# Patient Record
Sex: MALE | Race: WHITE | Employment: OTHER | URBAN - METROPOLITAN AREA
[De-identification: names, ages, dates, MRNs, and addresses within clinical notes are randomized per-mention and may not be internally consistent; named-entity substitution may affect disease eponyms.]

---

## 2017-01-01 ENCOUNTER — APPOINTMENT (OUTPATIENT)
Dept: GENERAL RADIOLOGY | Facility: HOSPITAL | Age: 77
DRG: 871 | End: 2017-01-01
Attending: INTERNAL MEDICINE
Payer: MEDICARE

## 2017-01-01 ENCOUNTER — APPOINTMENT (OUTPATIENT)
Dept: ULTRASOUND IMAGING | Facility: HOSPITAL | Age: 77
DRG: 871 | End: 2017-01-01
Attending: HOSPITALIST
Payer: MEDICARE

## 2017-01-01 ENCOUNTER — APPOINTMENT (OUTPATIENT)
Dept: CT IMAGING | Facility: HOSPITAL | Age: 77
DRG: 871 | End: 2017-01-01
Attending: HOSPITALIST
Payer: MEDICARE

## 2017-01-01 ENCOUNTER — APPOINTMENT (OUTPATIENT)
Dept: GENERAL RADIOLOGY | Facility: HOSPITAL | Age: 77
DRG: 871 | End: 2017-01-01
Attending: EMERGENCY MEDICINE
Payer: MEDICARE

## 2017-01-01 ENCOUNTER — HOSPITAL ENCOUNTER (INPATIENT)
Facility: HOSPITAL | Age: 77
LOS: 14 days | DRG: 871 | End: 2017-01-01
Attending: EMERGENCY MEDICINE | Admitting: HOSPITALIST
Payer: MEDICARE

## 2017-01-01 ENCOUNTER — APPOINTMENT (OUTPATIENT)
Dept: ULTRASOUND IMAGING | Facility: HOSPITAL | Age: 77
DRG: 871 | End: 2017-01-01
Attending: Other
Payer: MEDICARE

## 2017-01-01 ENCOUNTER — APPOINTMENT (OUTPATIENT)
Dept: GENERAL RADIOLOGY | Facility: HOSPITAL | Age: 77
DRG: 871 | End: 2017-01-01
Attending: HOSPITALIST
Payer: MEDICARE

## 2017-01-01 ENCOUNTER — APPOINTMENT (OUTPATIENT)
Dept: CT IMAGING | Facility: HOSPITAL | Age: 77
DRG: 871 | End: 2017-01-01
Attending: INTERNAL MEDICINE
Payer: MEDICARE

## 2017-01-01 ENCOUNTER — APPOINTMENT (OUTPATIENT)
Dept: ULTRASOUND IMAGING | Facility: HOSPITAL | Age: 77
DRG: 871 | End: 2017-01-01
Attending: EMERGENCY MEDICINE
Payer: MEDICARE

## 2017-01-01 ENCOUNTER — APPOINTMENT (OUTPATIENT)
Dept: CV DIAGNOSTICS | Facility: HOSPITAL | Age: 77
DRG: 871 | End: 2017-01-01
Attending: HOSPITALIST
Payer: MEDICARE

## 2017-01-01 ENCOUNTER — ANESTHESIA (OUTPATIENT)
Dept: CARDIOLOGY UNIT | Facility: HOSPITAL | Age: 77
DRG: 871 | End: 2017-01-01
Payer: MEDICARE

## 2017-01-01 ENCOUNTER — ANESTHESIA EVENT (OUTPATIENT)
Dept: CARDIOLOGY UNIT | Facility: HOSPITAL | Age: 77
DRG: 871 | End: 2017-01-01
Payer: MEDICARE

## 2017-01-01 VITALS
DIASTOLIC BLOOD PRESSURE: 47 MMHG | HEIGHT: 70.98 IN | TEMPERATURE: 97 F | WEIGHT: 152.88 LBS | OXYGEN SATURATION: 50 % | SYSTOLIC BLOOD PRESSURE: 76 MMHG | BODY MASS INDEX: 21.4 KG/M2

## 2017-01-01 DIAGNOSIS — J18.9 COMMUNITY ACQUIRED PNEUMONIA: Primary | ICD-10-CM

## 2017-01-01 DIAGNOSIS — K75.9 HEPATITIS: ICD-10-CM

## 2017-01-01 DIAGNOSIS — E16.2 HYPOGLYCEMIA: ICD-10-CM

## 2017-01-01 LAB
AFP-TM SERPL-MCNC: 2.4 NG/ML (ref 0–8.9)
ALBUMIN SERPL BCP-MCNC: 1.5 G/DL (ref 3.5–4.8)
ALBUMIN SERPL BCP-MCNC: 2.1 G/DL (ref 3.5–4.8)
ALBUMIN SERPL BCP-MCNC: 2.2 G/DL (ref 3.5–4.8)
ALBUMIN SERPL BCP-MCNC: 2.2 G/DL (ref 3.5–4.8)
ALBUMIN SERPL BCP-MCNC: 2.3 G/DL (ref 3.5–4.8)
ALBUMIN SERPL BCP-MCNC: 2.4 G/DL (ref 3.5–4.8)
ALBUMIN SERPL BCP-MCNC: 2.6 G/DL (ref 3.5–4.8)
ALBUMIN SERPL BCP-MCNC: 2.6 G/DL (ref 3.5–4.8)
ALBUMIN SERPL BCP-MCNC: 2.7 G/DL (ref 3.5–4.8)
ALBUMIN SERPL BCP-MCNC: 2.7 G/DL (ref 3.5–4.8)
ALBUMIN SERPL BCP-MCNC: 2.8 G/DL (ref 3.5–4.8)
ALBUMIN/GLOB SERPL: 0.6 {RATIO} (ref 1–2)
ALBUMIN/GLOB SERPL: 0.7 {RATIO} (ref 1–2)
ALBUMIN/GLOB SERPL: 0.8 {RATIO} (ref 1–2)
ALBUMIN/GLOB SERPL: 0.8 {RATIO} (ref 1–2)
ALP SERPL-CCNC: 46 U/L (ref 32–100)
ALP SERPL-CCNC: 49 U/L (ref 32–100)
ALP SERPL-CCNC: 49 U/L (ref 32–100)
ALP SERPL-CCNC: 50 U/L (ref 32–100)
ALP SERPL-CCNC: 55 U/L (ref 32–100)
ALP SERPL-CCNC: 57 U/L (ref 32–100)
ALP SERPL-CCNC: 57 U/L (ref 32–100)
ALP SERPL-CCNC: 66 U/L (ref 32–100)
ALP SERPL-CCNC: 69 U/L (ref 32–100)
ALP SERPL-CCNC: 70 U/L (ref 32–100)
ALP SERPL-CCNC: 82 U/L (ref 32–100)
ALP SERPL-CCNC: 84 U/L (ref 32–100)
ALP SERPL-CCNC: 85 U/L (ref 32–100)
ALT SERPL-CCNC: 104 U/L (ref 17–63)
ALT SERPL-CCNC: 132 U/L (ref 17–63)
ALT SERPL-CCNC: 146 U/L (ref 17–63)
ALT SERPL-CCNC: 152 U/L (ref 17–63)
ALT SERPL-CCNC: 236 U/L (ref 17–63)
ALT SERPL-CCNC: 268 U/L (ref 17–63)
ALT SERPL-CCNC: 347 U/L (ref 17–63)
ALT SERPL-CCNC: 426 U/L (ref 17–63)
ALT SERPL-CCNC: 437 U/L (ref 17–63)
ALT SERPL-CCNC: 54 U/L (ref 17–63)
ALT SERPL-CCNC: 54 U/L (ref 17–63)
ALT SERPL-CCNC: 76 U/L (ref 17–63)
ALT SERPL-CCNC: 90 U/L (ref 17–63)
AMMONIA PLAS-MCNC: 26 UG/DL (ref 28.2–80.4)
AMMONIA PLAS-MCNC: 31 UG/DL (ref 28.2–80.4)
AMMONIA PLAS-MCNC: 60 UG/DL (ref 28.2–80.4)
ANA NUCLEOLAR TITR SER IF: 80 {TITER}
ANION GAP SERPL CALC-SCNC: 10 MMOL/L (ref 0–18)
ANION GAP SERPL CALC-SCNC: 11 MMOL/L (ref 0–18)
ANION GAP SERPL CALC-SCNC: 12 MMOL/L (ref 0–18)
ANION GAP SERPL CALC-SCNC: 13 MMOL/L (ref 0–18)
ANION GAP SERPL CALC-SCNC: 17 MMOL/L (ref 0–18)
ANION GAP SERPL CALC-SCNC: 17 MMOL/L (ref 0–18)
ANION GAP SERPL CALC-SCNC: 18 MMOL/L (ref 0–18)
ANION GAP SERPL CALC-SCNC: 4 MMOL/L (ref 0–18)
ANION GAP SERPL CALC-SCNC: 8 MMOL/L (ref 0–18)
ANION GAP SERPL CALC-SCNC: 9 MMOL/L (ref 0–18)
ANION GAP SERPL CALC-SCNC: 9 MMOL/L (ref 0–18)
ANTIBODY SCREEN: NEGATIVE
ANTIBODY SCREEN: NEGATIVE
APAP SERPL-MCNC: <10 MCG/ML (ref 10–30)
APTT PPP: 48.2 SECONDS (ref 23.2–35.3)
APTT PPP: 50.1 SECONDS (ref 23.2–35.3)
APTT PPP: 59.8 SECONDS (ref 23.2–35.3)
AST SERPL-CCNC: 1161 U/L (ref 15–41)
AST SERPL-CCNC: 122 U/L (ref 15–41)
AST SERPL-CCNC: 161 U/L (ref 15–41)
AST SERPL-CCNC: 288 U/L (ref 15–41)
AST SERPL-CCNC: 297 U/L (ref 15–41)
AST SERPL-CCNC: 33 U/L (ref 15–41)
AST SERPL-CCNC: 410 U/L (ref 15–41)
AST SERPL-CCNC: 43 U/L (ref 15–41)
AST SERPL-CCNC: 55 U/L (ref 15–41)
AST SERPL-CCNC: 59 U/L (ref 15–41)
AST SERPL-CCNC: 662 U/L (ref 15–41)
AST SERPL-CCNC: 74 U/L (ref 15–41)
AST SERPL-CCNC: 998 U/L (ref 15–41)
BASE EXCESS BLD CALC-SCNC: -12.2 MMOL/L (ref ?–2)
BASE EXCESS BLD CALC-SCNC: -12.2 MMOL/L (ref ?–2)
BASE EXCESS BLD CALC-SCNC: -15.9 MMOL/L (ref ?–2)
BASE EXCESS BLD CALC-SCNC: -19 MMOL/L (ref ?–2)
BASE EXCESS BLD CALC-SCNC: -8 MMOL/L (ref ?–2)
BASOPHILS # BLD: 0 K/UL (ref 0–0.2)
BASOPHILS # BLD: 0.1 K/UL (ref 0–0.2)
BASOPHILS NFR BLD: 0 %
BASOPHILS NFR BLD: 1 %
BILIRUB DIRECT SERPL-MCNC: 2.3 MG/DL (ref 0–0.2)
BILIRUB SERPL-MCNC: 3.1 MG/DL (ref 0.3–1.2)
BILIRUB SERPL-MCNC: 3.2 MG/DL (ref 0.3–1.2)
BILIRUB SERPL-MCNC: 3.5 MG/DL (ref 0.3–1.2)
BILIRUB SERPL-MCNC: 3.7 MG/DL (ref 0.3–1.2)
BILIRUB SERPL-MCNC: 3.8 MG/DL (ref 0.3–1.2)
BILIRUB SERPL-MCNC: 4 MG/DL (ref 0.3–1.2)
BILIRUB SERPL-MCNC: 4 MG/DL (ref 0.3–1.2)
BILIRUB SERPL-MCNC: 4.4 MG/DL (ref 0.3–1.2)
BILIRUB SERPL-MCNC: 5.4 MG/DL (ref 0.3–1.2)
BILIRUB SERPL-MCNC: 5.6 MG/DL (ref 0.3–1.2)
BILIRUB SERPL-MCNC: 5.7 MG/DL (ref 0.3–1.2)
BILIRUB SERPL-MCNC: 5.9 MG/DL (ref 0.3–1.2)
BILIRUB SERPL-MCNC: 6.9 MG/DL (ref 0.3–1.2)
BILIRUB UR QL: NEGATIVE
BILIRUB UR QL: NEGATIVE
BILIRUB UR QL: POSITIVE
BLOOD TYPE BARCODE: 6200
BODY TEMPERATURE: 36
BUN SERPL-MCNC: 16 MG/DL (ref 8–20)
BUN SERPL-MCNC: 17 MG/DL (ref 8–20)
BUN SERPL-MCNC: 18 MG/DL (ref 8–20)
BUN SERPL-MCNC: 18 MG/DL (ref 8–20)
BUN SERPL-MCNC: 20 MG/DL (ref 8–20)
BUN SERPL-MCNC: 20 MG/DL (ref 8–20)
BUN SERPL-MCNC: 26 MG/DL (ref 8–20)
BUN SERPL-MCNC: 33 MG/DL (ref 8–20)
BUN SERPL-MCNC: 37 MG/DL (ref 8–20)
BUN SERPL-MCNC: 38 MG/DL (ref 8–20)
BUN SERPL-MCNC: 39 MG/DL (ref 8–20)
BUN SERPL-MCNC: 40 MG/DL (ref 8–20)
BUN SERPL-MCNC: 43 MG/DL (ref 8–20)
BUN SERPL-MCNC: 43 MG/DL (ref 8–20)
BUN/CREAT SERPL: 10.1 (ref 10–20)
BUN/CREAT SERPL: 12.2 (ref 10–20)
BUN/CREAT SERPL: 12.8 (ref 10–20)
BUN/CREAT SERPL: 16.8 (ref 10–20)
BUN/CREAT SERPL: 17.7 (ref 10–20)
BUN/CREAT SERPL: 18.4 (ref 10–20)
BUN/CREAT SERPL: 21.6 (ref 10–20)
BUN/CREAT SERPL: 23.5 (ref 10–20)
BUN/CREAT SERPL: 23.9 (ref 10–20)
BUN/CREAT SERPL: 24.5 (ref 10–20)
BUN/CREAT SERPL: 27 (ref 10–20)
BUN/CREAT SERPL: 27.1 (ref 10–20)
BUN/CREAT SERPL: 8.7 (ref 10–20)
BUN/CREAT SERPL: 8.9 (ref 10–20)
BUN/CREAT SERPL: 9.7 (ref 10–20)
BUN/CREAT SERPL: 9.9 (ref 10–20)
C282Y HEMOCHROMATOSIS MUTATION: NEGATIVE
CA-I BLD-SCNC: 1.23 MMOL/L (ref 0.95–1.32)
CALCIUM SERPL-MCNC: 7.3 MG/DL (ref 8.5–10.5)
CALCIUM SERPL-MCNC: 8.3 MG/DL (ref 8.5–10.5)
CALCIUM SERPL-MCNC: 8.3 MG/DL (ref 8.5–10.5)
CALCIUM SERPL-MCNC: 8.4 MG/DL (ref 8.5–10.5)
CALCIUM SERPL-MCNC: 8.5 MG/DL (ref 8.5–10.5)
CALCIUM SERPL-MCNC: 8.6 MG/DL (ref 8.5–10.5)
CALCIUM SERPL-MCNC: 8.8 MG/DL (ref 8.5–10.5)
CALCIUM SERPL-MCNC: 9.1 MG/DL (ref 8.5–10.5)
CHLORIDE SERPL-SCNC: 100 MMOL/L (ref 95–110)
CHLORIDE SERPL-SCNC: 103 MMOL/L (ref 95–110)
CHLORIDE SERPL-SCNC: 103 MMOL/L (ref 95–110)
CHLORIDE SERPL-SCNC: 105 MMOL/L (ref 95–110)
CHLORIDE SERPL-SCNC: 105 MMOL/L (ref 95–110)
CHLORIDE SERPL-SCNC: 107 MMOL/L (ref 95–110)
CHLORIDE SERPL-SCNC: 108 MMOL/L (ref 95–110)
CHLORIDE SERPL-SCNC: 108 MMOL/L (ref 95–110)
CHLORIDE SERPL-SCNC: 109 MMOL/L (ref 95–110)
CHLORIDE SERPL-SCNC: 110 MMOL/L (ref 95–110)
CHLORIDE SERPL-SCNC: 111 MMOL/L (ref 95–110)
CHLORIDE SERPL-SCNC: 111 MMOL/L (ref 95–110)
CHLORIDE SERPL-SCNC: 95 MMOL/L (ref 95–110)
CHLORIDE SERPL-SCNC: 97 MMOL/L (ref 95–110)
CHOLEST SERPL-MCNC: 132 MG/DL (ref 110–200)
CO2 SERPL-SCNC: 13 MMOL/L (ref 22–32)
CO2 SERPL-SCNC: 14 MMOL/L (ref 22–32)
CO2 SERPL-SCNC: 15 MMOL/L (ref 22–32)
CO2 SERPL-SCNC: 17 MMOL/L (ref 22–32)
CO2 SERPL-SCNC: 17 MMOL/L (ref 22–32)
CO2 SERPL-SCNC: 18 MMOL/L (ref 22–32)
CO2 SERPL-SCNC: 19 MMOL/L (ref 22–32)
CO2 SERPL-SCNC: 20 MMOL/L (ref 22–32)
CO2 SERPL-SCNC: 25 MMOL/L (ref 22–32)
COHGB MFR BLD: 1.6 % (ref 0–1.5)
COLOR UR: YELLOW
COLOR UR: YELLOW
CREAT SERPL-MCNC: 1.41 MG/DL (ref 0.5–1.5)
CREAT SERPL-MCNC: 1.44 MG/DL (ref 0.5–1.5)
CREAT SERPL-MCNC: 1.55 MG/DL (ref 0.5–1.5)
CREAT SERPL-MCNC: 1.64 MG/DL (ref 0.5–1.5)
CREAT SERPL-MCNC: 1.78 MG/DL (ref 0.5–1.5)
CREAT SERPL-MCNC: 1.8 MG/DL (ref 0.5–1.5)
CREAT SERPL-MCNC: 1.83 MG/DL (ref 0.5–1.5)
CREAT SERPL-MCNC: 1.84 MG/DL (ref 0.5–1.5)
CREAT SERPL-MCNC: 1.85 MG/DL (ref 0.5–1.5)
CREAT SERPL-MCNC: 1.85 MG/DL (ref 0.5–1.5)
CREAT SERPL-MCNC: 1.92 MG/DL (ref 0.5–1.5)
CREAT SERPL-MCNC: 1.97 MG/DL (ref 0.5–1.5)
CREAT SERPL-MCNC: 2.03 MG/DL (ref 0.5–1.5)
CREAT SERPL-MCNC: 2.03 MG/DL (ref 0.5–1.5)
CREAT SERPL-MCNC: 2.06 MG/DL (ref 0.5–1.5)
CREAT SERPL-MCNC: 2.09 MG/DL (ref 0.5–1.5)
EOSINOPHIL # BLD: 0 K/UL (ref 0–0.7)
EOSINOPHIL # BLD: 0.1 K/UL (ref 0–0.7)
EOSINOPHIL NFR BLD: 0 %
EOSINOPHIL NFR BLD: 1 %
EOSINOPHIL NFR BLD: 2 %
EOSINOPHIL NFR BLD: 2 %
EOSINOPHIL NFR BLD: 3 %
ERYTHROCYTE [DISTWIDTH] IN BLOOD BY AUTOMATED COUNT: 19.3 % (ref 11–15)
ERYTHROCYTE [DISTWIDTH] IN BLOOD BY AUTOMATED COUNT: 19.3 % (ref 11–15)
ERYTHROCYTE [DISTWIDTH] IN BLOOD BY AUTOMATED COUNT: 19.4 % (ref 11–15)
ERYTHROCYTE [DISTWIDTH] IN BLOOD BY AUTOMATED COUNT: 19.4 % (ref 11–15)
ERYTHROCYTE [DISTWIDTH] IN BLOOD BY AUTOMATED COUNT: 19.5 % (ref 11–15)
ERYTHROCYTE [DISTWIDTH] IN BLOOD BY AUTOMATED COUNT: 19.5 % (ref 11–15)
ERYTHROCYTE [DISTWIDTH] IN BLOOD BY AUTOMATED COUNT: 19.6 % (ref 11–15)
ERYTHROCYTE [DISTWIDTH] IN BLOOD BY AUTOMATED COUNT: 19.9 % (ref 11–15)
ERYTHROCYTE [DISTWIDTH] IN BLOOD BY AUTOMATED COUNT: 20 % (ref 11–15)
ERYTHROCYTE [DISTWIDTH] IN BLOOD BY AUTOMATED COUNT: 20.8 % (ref 11–15)
ERYTHROCYTE [DISTWIDTH] IN BLOOD BY AUTOMATED COUNT: 20.8 % (ref 11–15)
ERYTHROCYTE [DISTWIDTH] IN BLOOD BY AUTOMATED COUNT: 21.1 % (ref 11–15)
ERYTHROCYTE [DISTWIDTH] IN BLOOD BY AUTOMATED COUNT: 21.3 % (ref 11–15)
ERYTHROCYTE [DISTWIDTH] IN BLOOD BY AUTOMATED COUNT: 21.9 % (ref 11–15)
ERYTHROCYTE [DISTWIDTH] IN BLOOD BY AUTOMATED COUNT: 21.9 % (ref 11–15)
ERYTHROCYTE [DISTWIDTH] IN BLOOD BY AUTOMATED COUNT: 23.8 % (ref 11–15)
ERYTHROCYTE [DISTWIDTH] IN BLOOD BY AUTOMATED COUNT: 24 % (ref 11–15)
FERRITIN SERPL IA-MCNC: 1897 NG/ML (ref 24–336)
GLOBULIN PLAS-MCNC: 2.3 G/DL (ref 2.5–3.7)
GLOBULIN PLAS-MCNC: 3.3 G/DL (ref 2.5–3.7)
GLOBULIN PLAS-MCNC: 3.4 G/DL (ref 2.5–3.7)
GLOBULIN PLAS-MCNC: 3.5 G/DL (ref 2.5–3.7)
GLOBULIN PLAS-MCNC: 3.5 G/DL (ref 2.5–3.7)
GLOBULIN PLAS-MCNC: 3.6 G/DL (ref 2.5–3.7)
GLOBULIN PLAS-MCNC: 3.6 G/DL (ref 2.5–3.7)
GLOBULIN PLAS-MCNC: 3.7 G/DL (ref 2.5–3.7)
GLOBULIN PLAS-MCNC: 3.7 G/DL (ref 2.5–3.7)
GLOBULIN PLAS-MCNC: 3.9 G/DL (ref 2.5–3.7)
GLUCOSE BLDC GLUCOMTR-MCNC: 104 MG/DL (ref 70–99)
GLUCOSE BLDC GLUCOMTR-MCNC: 114 MG/DL (ref 70–99)
GLUCOSE BLDC GLUCOMTR-MCNC: 116 MG/DL (ref 70–99)
GLUCOSE BLDC GLUCOMTR-MCNC: 118 MG/DL (ref 70–99)
GLUCOSE BLDC GLUCOMTR-MCNC: 135 MG/DL (ref 70–99)
GLUCOSE BLDC GLUCOMTR-MCNC: 136 MG/DL (ref 70–99)
GLUCOSE BLDC GLUCOMTR-MCNC: 138 MG/DL (ref 70–99)
GLUCOSE BLDC GLUCOMTR-MCNC: 139 MG/DL (ref 70–99)
GLUCOSE BLDC GLUCOMTR-MCNC: 144 MG/DL (ref 70–99)
GLUCOSE BLDC GLUCOMTR-MCNC: 164 MG/DL (ref 70–99)
GLUCOSE BLDC GLUCOMTR-MCNC: 166 MG/DL (ref 70–99)
GLUCOSE BLDC GLUCOMTR-MCNC: 167 MG/DL (ref 70–99)
GLUCOSE BLDC GLUCOMTR-MCNC: 170 MG/DL (ref 70–99)
GLUCOSE BLDC GLUCOMTR-MCNC: 173 MG/DL (ref 70–99)
GLUCOSE BLDC GLUCOMTR-MCNC: 189 MG/DL (ref 70–99)
GLUCOSE BLDC GLUCOMTR-MCNC: 196 MG/DL (ref 70–99)
GLUCOSE BLDC GLUCOMTR-MCNC: 196 MG/DL (ref 70–99)
GLUCOSE BLDC GLUCOMTR-MCNC: 203 MG/DL (ref 70–99)
GLUCOSE BLDC GLUCOMTR-MCNC: 208 MG/DL (ref 70–99)
GLUCOSE BLDC GLUCOMTR-MCNC: 216 MG/DL (ref 70–99)
GLUCOSE BLDC GLUCOMTR-MCNC: 221 MG/DL (ref 70–99)
GLUCOSE BLDC GLUCOMTR-MCNC: 227 MG/DL (ref 70–99)
GLUCOSE BLDC GLUCOMTR-MCNC: 248 MG/DL (ref 70–99)
GLUCOSE BLDC GLUCOMTR-MCNC: 249 MG/DL (ref 70–99)
GLUCOSE BLDC GLUCOMTR-MCNC: 256 MG/DL (ref 70–99)
GLUCOSE BLDC GLUCOMTR-MCNC: 272 MG/DL (ref 70–99)
GLUCOSE BLDC GLUCOMTR-MCNC: 37 MG/DL (ref 70–99)
GLUCOSE BLDC GLUCOMTR-MCNC: 38 MG/DL (ref 70–99)
GLUCOSE BLDC GLUCOMTR-MCNC: 49 MG/DL (ref 70–99)
GLUCOSE BLDC GLUCOMTR-MCNC: 71 MG/DL (ref 70–99)
GLUCOSE BLDC GLUCOMTR-MCNC: 77 MG/DL (ref 70–99)
GLUCOSE BLDC GLUCOMTR-MCNC: 80 MG/DL (ref 70–99)
GLUCOSE BLDC GLUCOMTR-MCNC: 84 MG/DL (ref 70–99)
GLUCOSE BLDC GLUCOMTR-MCNC: 89 MG/DL (ref 70–99)
GLUCOSE BLDC GLUCOMTR-MCNC: 90 MG/DL (ref 70–99)
GLUCOSE BLDC GLUCOMTR-MCNC: 91 MG/DL (ref 70–99)
GLUCOSE BLDC GLUCOMTR-MCNC: 92 MG/DL (ref 70–99)
GLUCOSE BLDC GLUCOMTR-MCNC: 95 MG/DL (ref 70–99)
GLUCOSE BLDC GLUCOMTR-MCNC: 97 MG/DL (ref 70–99)
GLUCOSE BLDC GLUCOMTR-MCNC: <30 MG/DL (ref 70–99)
GLUCOSE BLDC GLUCOMTR-MCNC: <30 MG/DL (ref 70–99)
GLUCOSE SERPL-MCNC: 105 MG/DL (ref 70–99)
GLUCOSE SERPL-MCNC: 145 MG/DL (ref 70–99)
GLUCOSE SERPL-MCNC: 152 MG/DL (ref 70–99)
GLUCOSE SERPL-MCNC: 160 MG/DL (ref 70–99)
GLUCOSE SERPL-MCNC: 162 MG/DL (ref 70–99)
GLUCOSE SERPL-MCNC: 164 MG/DL (ref 70–99)
GLUCOSE SERPL-MCNC: 167 MG/DL (ref 70–99)
GLUCOSE SERPL-MCNC: 168 MG/DL (ref 70–99)
GLUCOSE SERPL-MCNC: 179 MG/DL (ref 70–99)
GLUCOSE SERPL-MCNC: 183 MG/DL (ref 70–99)
GLUCOSE SERPL-MCNC: 187 MG/DL (ref 70–99)
GLUCOSE SERPL-MCNC: 187 MG/DL (ref 70–99)
GLUCOSE SERPL-MCNC: 199 MG/DL (ref 70–99)
GLUCOSE SERPL-MCNC: 202 MG/DL (ref 70–99)
GLUCOSE SERPL-MCNC: 220 MG/DL (ref 70–99)
GLUCOSE SERPL-MCNC: 246 MG/DL (ref 70–99)
GLUCOSE UR-MCNC: NEGATIVE MG/DL
GRAN CASTS #/AREA UR COMP ASSIST: 1 /LPF
H63D HEMOCHROMATOSIS MUTATION: NEGATIVE
HAV IGM SERPL QL IA: NONREACTIVE
HBA1C MFR BLD: 5.6 % (ref 4–6)
HBV CORE IGM SERPL QL IA: NONREACTIVE
HBV SURFACE AG SERPL QL IA: NONREACTIVE
HCO3 BLDA-SCNC: 10 MEQ/L (ref 21–27)
HCO3 BLDA-SCNC: 12.9 MEQ/L (ref 21–27)
HCO3 BLDA-SCNC: 13.4 MEQ/L (ref 21–27)
HCO3 BLDA-SCNC: 15.5 MEQ/L (ref 21–27)
HCO3 BLDV-SCNC: 13.6 MEQ/L (ref 22–26)
HCT VFR BLD AUTO: 34.5 % (ref 41–52)
HCT VFR BLD AUTO: 34.7 % (ref 41–52)
HCT VFR BLD AUTO: 35.8 % (ref 41–52)
HCT VFR BLD AUTO: 36.3 % (ref 41–52)
HCT VFR BLD AUTO: 36.4 % (ref 41–52)
HCT VFR BLD AUTO: 37.1 % (ref 41–52)
HCT VFR BLD AUTO: 37.4 % (ref 41–52)
HCT VFR BLD AUTO: 37.4 % (ref 41–52)
HCT VFR BLD AUTO: 37.5 % (ref 41–52)
HCT VFR BLD AUTO: 37.9 % (ref 41–52)
HCT VFR BLD AUTO: 38.4 % (ref 41–52)
HCT VFR BLD AUTO: 38.5 % (ref 41–52)
HCT VFR BLD AUTO: 39.1 % (ref 41–52)
HCT VFR BLD AUTO: 39.3 % (ref 41–52)
HCT VFR BLD AUTO: 39.7 % (ref 41–52)
HCT VFR BLD AUTO: 40.5 % (ref 41–52)
HCT VFR BLD AUTO: 40.7 % (ref 41–52)
HCV AB SERPL QL IA: NONREACTIVE
HDLC SERPL-MCNC: 24 MG/DL
HGB BLD-MCNC: 10.4 G/DL (ref 13.5–17.5)
HGB BLD-MCNC: 11.1 G/DL (ref 13.5–17.5)
HGB BLD-MCNC: 11.3 G/DL (ref 13.5–17.5)
HGB BLD-MCNC: 11.8 G/DL (ref 13.5–17.5)
HGB BLD-MCNC: 12.3 G/DL (ref 13.5–17.5)
HGB BLD-MCNC: 12.4 G/DL (ref 13.5–17.5)
HGB BLD-MCNC: 12.5 G/DL (ref 13.5–17.5)
HGB BLD-MCNC: 12.5 G/DL (ref 13.5–17.5)
HGB BLD-MCNC: 12.6 G/DL (ref 13.5–17.5)
HGB BLD-MCNC: 12.7 G/DL (ref 13.5–17.5)
HGB BLD-MCNC: 12.9 G/DL (ref 13.5–17.5)
HGB BLD-MCNC: 13.2 G/DL (ref 13.5–17.5)
HGB BLD-MCNC: 13.4 G/DL (ref 13.5–17.5)
HGB BLD-MCNC: 13.6 G/DL (ref 13.5–17.5)
HGB BLD-MCNC: 13.7 G/DL (ref 13.5–17.5)
HGB BLD-MCNC: 13.7 G/DL (ref 13.5–17.5)
HYALINE CASTS #/AREA URNS AUTO: 2 /LPF
HYALINE CASTS #/AREA URNS AUTO: 32 /LPF
INR BLD: 1.4 (ref 0.9–1.2)
INR BLD: 1.5 (ref 0.9–1.2)
INR BLD: 1.6 (ref 0.9–1.2)
INR BLD: 1.6 (ref 0.9–1.2)
INR BLD: 1.7 (ref 0.9–1.2)
INR BLD: 1.8 (ref 0.9–1.2)
INR BLD: 2.3 (ref 0.9–1.2)
INR BLD: 2.3 (ref 0.9–1.2)
INR BLD: 3 (ref 0.9–1.2)
INR BLD: 3.7 (ref 0.9–1.2)
IRON SATN MFR SERPL: 91 % (ref 20–55)
IRON SERPL-MCNC: 190 MCG/DL (ref 45–182)
KETONES UR-MCNC: NEGATIVE MG/DL
KETONES UR-MCNC: NEGATIVE MG/DL
LACTATE SERPL-SCNC: 10.8 MMOL/L (ref 0.5–2.2)
LACTATE SERPL-SCNC: 11 MMOL/L (ref 0.5–2.2)
LACTATE SERPL-SCNC: 12.4 MMOL/L (ref 0.5–2.2)
LACTATE SERPL-SCNC: 2.3 MMOL/L (ref 0.5–2.2)
LACTATE SERPL-SCNC: 2.5 MMOL/L (ref 0.5–2.2)
LACTATE SERPL-SCNC: 2.6 MMOL/L (ref 0.5–2.2)
LACTATE SERPL-SCNC: 2.7 MMOL/L (ref 0.5–2.2)
LACTATE SERPL-SCNC: 3 MMOL/L (ref 0.5–2.2)
LACTATE SERPL-SCNC: 3.5 MMOL/L (ref 0.5–2.2)
LACTATE SERPL-SCNC: 4.4 MMOL/L (ref 0.5–2.2)
LACTATE SERPL-SCNC: 5.2 MMOL/L (ref 0.5–2.2)
LDLC SERPL CALC-MCNC: 91 MG/DL (ref 0–99)
LEUKOCYTE ESTERASE UR QL STRIP.AUTO: NEGATIVE
LYMPHOCYTES # BLD: 0.2 K/UL (ref 1–4)
LYMPHOCYTES # BLD: 0.3 K/UL (ref 1–4)
LYMPHOCYTES # BLD: 0.4 K/UL (ref 1–4)
LYMPHOCYTES # BLD: 0.4 K/UL (ref 1–4)
LYMPHOCYTES # BLD: 0.5 K/UL (ref 1–4)
LYMPHOCYTES # BLD: 0.5 K/UL (ref 1–4)
LYMPHOCYTES # BLD: 0.6 K/UL (ref 1–4)
LYMPHOCYTES # BLD: 0.7 K/UL (ref 1–4)
LYMPHOCYTES # BLD: 0.8 K/UL (ref 1–4)
LYMPHOCYTES NFR BLD: 13 %
LYMPHOCYTES NFR BLD: 17 %
LYMPHOCYTES NFR BLD: 2 %
LYMPHOCYTES NFR BLD: 3 %
LYMPHOCYTES NFR BLD: 4 %
LYMPHOCYTES NFR BLD: 5 %
LYMPHOCYTES NFR BLD: 6 %
MAGNESIUM SERPL-MCNC: 1.4 MG/DL (ref 1.8–2.5)
MAGNESIUM SERPL-MCNC: 1.7 MG/DL (ref 1.8–2.5)
MAGNESIUM SERPL-MCNC: 1.8 MG/DL (ref 1.8–2.5)
MAGNESIUM SERPL-MCNC: 1.9 MG/DL (ref 1.8–2.5)
MAGNESIUM SERPL-MCNC: 1.9 MG/DL (ref 1.8–2.5)
MAGNESIUM SERPL-MCNC: 2 MG/DL (ref 1.8–2.5)
MAGNESIUM SERPL-MCNC: 2.2 MG/DL (ref 1.8–2.5)
MCH RBC QN AUTO: 33.1 PG (ref 27–32)
MCH RBC QN AUTO: 33.3 PG (ref 27–32)
MCH RBC QN AUTO: 33.4 PG (ref 27–32)
MCH RBC QN AUTO: 33.4 PG (ref 27–32)
MCH RBC QN AUTO: 33.5 PG (ref 27–32)
MCH RBC QN AUTO: 33.5 PG (ref 27–32)
MCH RBC QN AUTO: 33.6 PG (ref 27–32)
MCH RBC QN AUTO: 33.6 PG (ref 27–32)
MCH RBC QN AUTO: 33.7 PG (ref 27–32)
MCH RBC QN AUTO: 33.9 PG (ref 27–32)
MCH RBC QN AUTO: 33.9 PG (ref 27–32)
MCH RBC QN AUTO: 34.7 PG (ref 27–32)
MCHC RBC AUTO-ENTMCNC: 30.7 G/DL (ref 32–37)
MCHC RBC AUTO-ENTMCNC: 32.3 G/DL (ref 32–37)
MCHC RBC AUTO-ENTMCNC: 33 G/DL (ref 32–37)
MCHC RBC AUTO-ENTMCNC: 33.1 G/DL (ref 32–37)
MCHC RBC AUTO-ENTMCNC: 33.2 G/DL (ref 32–37)
MCHC RBC AUTO-ENTMCNC: 33.5 G/DL (ref 32–37)
MCHC RBC AUTO-ENTMCNC: 33.5 G/DL (ref 32–37)
MCHC RBC AUTO-ENTMCNC: 33.7 G/DL (ref 32–37)
MCHC RBC AUTO-ENTMCNC: 33.8 G/DL (ref 32–37)
MCHC RBC AUTO-ENTMCNC: 33.8 G/DL (ref 32–37)
MCHC RBC AUTO-ENTMCNC: 33.9 G/DL (ref 32–37)
MCHC RBC AUTO-ENTMCNC: 34 G/DL (ref 32–37)
MCHC RBC AUTO-ENTMCNC: 34.1 G/DL (ref 32–37)
MCHC RBC AUTO-ENTMCNC: 34.1 G/DL (ref 32–37)
MCHC RBC AUTO-ENTMCNC: 34.2 G/DL (ref 32–37)
MCHC RBC AUTO-ENTMCNC: 34.4 G/DL (ref 32–37)
MCHC RBC AUTO-ENTMCNC: 34.5 G/DL (ref 32–37)
MCV RBC AUTO: 100.7 FL (ref 80–100)
MCV RBC AUTO: 101.2 FL (ref 80–100)
MCV RBC AUTO: 102.4 FL (ref 80–100)
MCV RBC AUTO: 107.5 FL (ref 80–100)
MCV RBC AUTO: 110.7 FL (ref 80–100)
MCV RBC AUTO: 97.3 FL (ref 80–100)
MCV RBC AUTO: 97.6 FL (ref 80–100)
MCV RBC AUTO: 97.9 FL (ref 80–100)
MCV RBC AUTO: 98 FL (ref 80–100)
MCV RBC AUTO: 98.2 FL (ref 80–100)
MCV RBC AUTO: 98.3 FL (ref 80–100)
MCV RBC AUTO: 98.4 FL (ref 80–100)
MCV RBC AUTO: 98.4 FL (ref 80–100)
MCV RBC AUTO: 98.5 FL (ref 80–100)
MCV RBC AUTO: 98.5 FL (ref 80–100)
MCV RBC AUTO: 99.6 FL (ref 80–100)
MCV RBC AUTO: 99.8 FL (ref 80–100)
METAMYELOCYTES # BLD MANUAL: 0.18 K/UL
METAMYELOCYTES # BLD MANUAL: 0.26 K/UL
METAMYELOCYTES NFR BLD: 3 %
METHGB MFR BLD: 0.2 % SAT (ref 0.4–1.5)
MODIFIED ALLEN TEST: POSITIVE
MONOCYTES # BLD: 0.1 K/UL (ref 0–1)
MONOCYTES # BLD: 0.2 K/UL (ref 0–1)
MONOCYTES # BLD: 0.3 K/UL (ref 0–1)
MONOCYTES # BLD: 0.3 K/UL (ref 0–1)
MONOCYTES # BLD: 0.4 K/UL (ref 0–1)
MONOCYTES # BLD: 0.5 K/UL (ref 0–1)
MONOCYTES # BLD: 0.6 K/UL (ref 0–1)
MONOCYTES # BLD: 0.7 K/UL (ref 0–1)
MONOCYTES # BLD: 0.7 K/UL (ref 0–1)
MONOCYTES # BLD: 1.3 K/UL (ref 0–1)
MONOCYTES # BLD: 1.3 K/UL (ref 0–1)
MONOCYTES NFR BLD: 2 %
MONOCYTES NFR BLD: 2 %
MONOCYTES NFR BLD: 3 %
MONOCYTES NFR BLD: 4 %
MONOCYTES NFR BLD: 5 %
MONOCYTES NFR BLD: 6 %
MONOCYTES NFR BLD: 7 %
MONOCYTES NFR BLD: 8 %
MONOCYTES NFR BLD: 8 %
MONOCYTES NFR BLD: 9 %
MRSA DNA SPEC QL NAA+PROBE: NEGATIVE
MYELOCYTES NFR BLD: 2 %
NEUTROPHILS # BLD AUTO: 10.2 K/UL (ref 1.8–7.7)
NEUTROPHILS # BLD AUTO: 11.5 K/UL (ref 1.8–7.7)
NEUTROPHILS # BLD AUTO: 11.8 K/UL (ref 1.8–7.7)
NEUTROPHILS # BLD AUTO: 14.1 K/UL (ref 1.8–7.7)
NEUTROPHILS # BLD AUTO: 3.1 K/UL (ref 1.8–7.7)
NEUTROPHILS # BLD AUTO: 3.5 K/UL (ref 1.8–7.7)
NEUTROPHILS # BLD AUTO: 5.4 K/UL (ref 1.8–7.7)
NEUTROPHILS # BLD AUTO: 7.1 K/UL (ref 1.8–7.7)
NEUTROPHILS # BLD AUTO: 7.3 K/UL (ref 1.8–7.7)
NEUTROPHILS # BLD AUTO: 7.5 K/UL (ref 1.8–7.7)
NEUTROPHILS # BLD AUTO: 7.6 K/UL (ref 1.8–7.7)
NEUTROPHILS # BLD AUTO: 7.7 K/UL (ref 1.8–7.7)
NEUTROPHILS # BLD AUTO: 8.1 K/UL (ref 1.8–7.7)
NEUTROPHILS # BLD AUTO: 8.6 K/UL (ref 1.8–7.7)
NEUTROPHILS # BLD AUTO: 9.3 K/UL (ref 1.8–7.7)
NEUTROPHILS # BLD AUTO: 9.5 K/UL (ref 1.8–7.7)
NEUTROPHILS # BLD AUTO: 9.6 K/UL (ref 1.8–7.7)
NEUTROPHILS NFR BLD: 39 %
NEUTROPHILS NFR BLD: 62 %
NEUTROPHILS NFR BLD: 74 %
NEUTROPHILS NFR BLD: 80 %
NEUTROPHILS NFR BLD: 87 %
NEUTROPHILS NFR BLD: 89 %
NEUTROPHILS NFR BLD: 90 %
NEUTROPHILS NFR BLD: 91 %
NEUTROPHILS NFR BLD: 92 %
NEUTROPHILS NFR BLD: 93 %
NEUTROPHILS NFR BLD: 93 %
NEUTS BAND NFR BLD: 23 %
NEUTS BAND NFR BLD: 49 %
NITRITE UR QL STRIP.AUTO: NEGATIVE
NONHDLC SERPL-MCNC: 108 MG/DL
NRBC BLD-RTO: 1 % (ref ?–1)
NRBC BLD-RTO: 3 % (ref ?–1)
NRBC BLD-RTO: 6 % (ref ?–1)
O2 CT BLD-SCNC: 14.8 VOL% (ref 15–23)
O2 CT BLD-SCNC: 16 VOL% (ref 15–23)
O2 CT BLD-SCNC: 16.2 VOL% (ref 15–23)
O2 CT BLD-SCNC: 18.3 VOL% (ref 15–23)
O2 CT BLD-SCNC: 7.3 VOL% (ref 15–23)
O2/TOTAL GAS SETTING VFR VENT: 100 %
O2/TOTAL GAS SETTING VFR VENT: 60 %
OSMOLALITY SERPL: 290 MOSM/KG (ref 275–295)
OSMOLALITY UR CALC.SUM OF ELEC: 271 MOSM/KG (ref 275–295)
OSMOLALITY UR CALC.SUM OF ELEC: 273 MOSM/KG (ref 275–295)
OSMOLALITY UR CALC.SUM OF ELEC: 276 MOSM/KG (ref 275–295)
OSMOLALITY UR CALC.SUM OF ELEC: 280 MOSM/KG (ref 275–295)
OSMOLALITY UR CALC.SUM OF ELEC: 282 MOSM/KG (ref 275–295)
OSMOLALITY UR CALC.SUM OF ELEC: 288 MOSM/KG (ref 275–295)
OSMOLALITY UR CALC.SUM OF ELEC: 288 MOSM/KG (ref 275–295)
OSMOLALITY UR CALC.SUM OF ELEC: 292 MOSM/KG (ref 275–295)
OSMOLALITY UR CALC.SUM OF ELEC: 294 MOSM/KG (ref 275–295)
OSMOLALITY UR CALC.SUM OF ELEC: 296 MOSM/KG (ref 275–295)
OSMOLALITY UR CALC.SUM OF ELEC: 297 MOSM/KG (ref 275–295)
OSMOLALITY UR CALC.SUM OF ELEC: 299 MOSM/KG (ref 275–295)
OSMOLALITY UR CALC.SUM OF ELEC: 299 MOSM/KG (ref 275–295)
OSMOLALITY UR CALC.SUM OF ELEC: 300 MOSM/KG (ref 275–295)
OSMOLALITY UR CALC.SUM OF ELEC: 302 MOSM/KG (ref 275–295)
OSMOLALITY UR CALC.SUM OF ELEC: 303 MOSM/KG (ref 275–295)
OXYGEN LITERS/MINUTE: 15 L/MIN
OXYGEN LITERS/MINUTE: 4 L/MIN
OXYGEN LITERS/MINUTE: 5 L/MIN
PCO2 BLDA: 28 MM HG (ref 35–45)
PCO2 BLDA: 28 MM HG (ref 35–45)
PCO2 BLDA: 31 MM HG (ref 35–45)
PCO2 BLDA: 36 MM HG (ref 35–45)
PCO2 BLDV: 51 MM HG (ref 38–50)
PEEP SETTING VENT: 5 CM H2O
PEEP SETTING VENT: 5 CM H2O
PH BLDA: 7.07 [PH] (ref 7.35–7.45)
PH BLDA: 7.26 [PH] (ref 7.35–7.45)
PH BLDA: 7.29 [PH] (ref 7.35–7.45)
PH BLDA: 7.37 [PH] (ref 7.35–7.45)
PH BLDV: 7.06 [PH] (ref 7.32–7.43)
PH UR: 5 [PH] (ref 5–8)
PHOSPHATE SERPL-MCNC: 3.6 MG/DL (ref 2.4–4.7)
PLATELET # BLD AUTO: 110 K/UL (ref 140–400)
PLATELET # BLD AUTO: 26 K/UL (ref 140–400)
PLATELET # BLD AUTO: 27 K/UL (ref 140–400)
PLATELET # BLD AUTO: 27 K/UL (ref 140–400)
PLATELET # BLD AUTO: 30 K/UL (ref 140–400)
PLATELET # BLD AUTO: 31 K/UL (ref 140–400)
PLATELET # BLD AUTO: 33 K/UL (ref 140–400)
PLATELET # BLD AUTO: 34 K/UL (ref 140–400)
PLATELET # BLD AUTO: 34 K/UL (ref 140–400)
PLATELET # BLD AUTO: 35 K/UL (ref 140–400)
PLATELET # BLD AUTO: 44 K/UL (ref 140–400)
PLATELET # BLD AUTO: 44 K/UL (ref 140–400)
PLATELET # BLD AUTO: 47 K/UL (ref 140–400)
PLATELET # BLD AUTO: 54 K/UL (ref 140–400)
PLATELET # BLD AUTO: 57 K/UL (ref 140–400)
PLATELET # BLD AUTO: 72 K/UL (ref 140–400)
PLATELET # BLD AUTO: 97 K/UL (ref 140–400)
PMV BLD AUTO: 10.1 FL (ref 7.4–10.3)
PMV BLD AUTO: 10.3 FL (ref 7.4–10.3)
PMV BLD AUTO: 10.5 FL (ref 7.4–10.3)
PMV BLD AUTO: 10.6 FL (ref 7.4–10.3)
PMV BLD AUTO: 10.8 FL (ref 7.4–10.3)
PMV BLD AUTO: 11 FL (ref 7.4–10.3)
PMV BLD AUTO: 11.1 FL (ref 7.4–10.3)
PMV BLD AUTO: 11.2 FL (ref 7.4–10.3)
PMV BLD AUTO: 11.4 FL (ref 7.4–10.3)
PMV BLD AUTO: 11.5 FL (ref 7.4–10.3)
PMV BLD AUTO: 11.7 FL (ref 7.4–10.3)
PMV BLD AUTO: 11.8 FL (ref 7.4–10.3)
PMV BLD AUTO: 8.8 FL (ref 7.4–10.3)
PMV BLD AUTO: 9 FL (ref 7.4–10.3)
PMV BLD AUTO: 9.1 FL (ref 7.4–10.3)
PMV BLD AUTO: 9.6 FL (ref 7.4–10.3)
PMV BLD AUTO: 9.7 FL (ref 7.4–10.3)
PO2 BLDA: 137 MM HG (ref 80–100)
PO2 BLDA: 138 MM HG (ref 80–100)
PO2 BLDA: 155 MM HG (ref 80–100)
PO2 BLDA: 239 MM HG (ref 80–100)
PO2 BLDV: 46 MM HG (ref 35–40)
PO2 SATN ADJ TO 0.5 BLD: 27 MMHG (ref 24–28)
PO2 SATN ADJ TO 0.5 BLD: 29 MMHG (ref 24–28)
PO2 SATN ADJ TO 0.5 BLD: 30 MMHG (ref 24–28)
PO2 SATN ADJ TO 0.5 BLD: 36 MMHG (ref 24–28)
PO2 SATN ADJ TO 0.5 BLD: 42 MMHG (ref 24–28)
POTASSIUM (BLOOD GAS): 4.6 MMOL/L (ref 3.3–5.1)
POTASSIUM SERPL-SCNC: 3.2 MMOL/L (ref 3.3–5.1)
POTASSIUM SERPL-SCNC: 3.3 MMOL/L (ref 3.3–5.1)
POTASSIUM SERPL-SCNC: 3.3 MMOL/L (ref 3.3–5.1)
POTASSIUM SERPL-SCNC: 3.5 MMOL/L (ref 3.3–5.1)
POTASSIUM SERPL-SCNC: 3.5 MMOL/L (ref 3.3–5.1)
POTASSIUM SERPL-SCNC: 3.6 MMOL/L (ref 3.3–5.1)
POTASSIUM SERPL-SCNC: 3.6 MMOL/L (ref 3.3–5.1)
POTASSIUM SERPL-SCNC: 3.7 MMOL/L (ref 3.3–5.1)
POTASSIUM SERPL-SCNC: 3.8 MMOL/L (ref 3.3–5.1)
POTASSIUM SERPL-SCNC: 3.8 MMOL/L (ref 3.3–5.1)
POTASSIUM SERPL-SCNC: 3.9 MMOL/L (ref 3.3–5.1)
POTASSIUM SERPL-SCNC: 4 MMOL/L (ref 3.3–5.1)
POTASSIUM SERPL-SCNC: 4 MMOL/L (ref 3.3–5.1)
POTASSIUM SERPL-SCNC: 4.1 MMOL/L (ref 3.3–5.1)
POTASSIUM SERPL-SCNC: 4.1 MMOL/L (ref 3.3–5.1)
POTASSIUM SERPL-SCNC: 4.2 MMOL/L (ref 3.3–5.1)
POTASSIUM SERPL-SCNC: 4.3 MMOL/L (ref 3.3–5.1)
POTASSIUM SERPL-SCNC: 4.3 MMOL/L (ref 3.3–5.1)
POTASSIUM SERPL-SCNC: 4.4 MMOL/L (ref 3.3–5.1)
POTASSIUM SERPL-SCNC: 4.4 MMOL/L (ref 3.3–5.1)
POTASSIUM SERPL-SCNC: 4.5 MMOL/L (ref 3.3–5.1)
POTASSIUM SERPL-SCNC: 5.3 MMOL/L (ref 3.3–5.1)
PROCALCITONIN SERPL-MCNC: 0.19 NG/ML (ref ?–0.11)
PROCALCITONIN SERPL-MCNC: 0.39 NG/ML (ref ?–0.11)
PROCALCITONIN SERPL-MCNC: 0.51 NG/ML (ref ?–0.11)
PROT SERPL-MCNC: 3.8 G/DL (ref 5.9–8.4)
PROT SERPL-MCNC: 5.5 G/DL (ref 5.9–8.4)
PROT SERPL-MCNC: 5.6 G/DL (ref 5.9–8.4)
PROT SERPL-MCNC: 5.6 G/DL (ref 5.9–8.4)
PROT SERPL-MCNC: 5.7 G/DL (ref 5.9–8.4)
PROT SERPL-MCNC: 5.7 G/DL (ref 5.9–8.4)
PROT SERPL-MCNC: 6 G/DL (ref 5.9–8.4)
PROT SERPL-MCNC: 6 G/DL (ref 5.9–8.4)
PROT SERPL-MCNC: 6.1 G/DL (ref 5.9–8.4)
PROT SERPL-MCNC: 6.2 G/DL (ref 5.9–8.4)
PROT SERPL-MCNC: 6.3 G/DL (ref 5.9–8.4)
PROT SERPL-MCNC: 6.4 G/DL (ref 5.9–8.4)
PROT SERPL-MCNC: 6.5 G/DL (ref 5.9–8.4)
PROT UR-MCNC: 100 MG/DL
PROT UR-MCNC: 30 MG/DL
PROT UR-MCNC: 30 MG/DL
PROTHROMBIN TIME: 17 SECONDS (ref 11.8–14.5)
PROTHROMBIN TIME: 17 SECONDS (ref 11.8–14.5)
PROTHROMBIN TIME: 17.1 SECONDS (ref 11.8–14.5)
PROTHROMBIN TIME: 17.4 SECONDS (ref 11.8–14.5)
PROTHROMBIN TIME: 17.8 SECONDS (ref 11.8–14.5)
PROTHROMBIN TIME: 17.9 SECONDS (ref 11.8–14.5)
PROTHROMBIN TIME: 18.6 SECONDS (ref 11.8–14.5)
PROTHROMBIN TIME: 19 SECONDS (ref 11.8–14.5)
PROTHROMBIN TIME: 19.8 SECONDS (ref 11.8–14.5)
PROTHROMBIN TIME: 20.4 SECONDS (ref 11.8–14.5)
PROTHROMBIN TIME: 24.5 SECONDS (ref 11.8–14.5)
PROTHROMBIN TIME: 24.8 SECONDS (ref 11.8–14.5)
PROTHROMBIN TIME: 31 SECONDS (ref 11.8–14.5)
PROTHROMBIN TIME: 36.2 SECONDS (ref 11.8–14.5)
PUNCTURE CHARGE: NO
PUNCTURE CHARGE: NO
PUNCTURE CHARGE: YES
RBC # BLD AUTO: 3.21 M/UL (ref 4.5–5.9)
RBC # BLD AUTO: 3.53 M/UL (ref 4.5–5.9)
RBC # BLD AUTO: 3.64 M/UL (ref 4.5–5.9)
RBC # BLD AUTO: 3.67 M/UL (ref 4.5–5.9)
RBC # BLD AUTO: 3.69 M/UL (ref 4.5–5.9)
RBC # BLD AUTO: 3.7 M/UL (ref 4.5–5.9)
RBC # BLD AUTO: 3.72 M/UL (ref 4.5–5.9)
RBC # BLD AUTO: 3.73 M/UL (ref 4.5–5.9)
RBC # BLD AUTO: 3.78 M/UL (ref 4.5–5.9)
RBC # BLD AUTO: 3.8 M/UL (ref 4.5–5.9)
RBC # BLD AUTO: 3.8 M/UL (ref 4.5–5.9)
RBC # BLD AUTO: 3.82 M/UL (ref 4.5–5.9)
RBC # BLD AUTO: 3.86 M/UL (ref 4.5–5.9)
RBC # BLD AUTO: 3.95 M/UL (ref 4.5–5.9)
RBC # BLD AUTO: 4.01 M/UL (ref 4.5–5.9)
RBC # BLD AUTO: 4.06 M/UL (ref 4.5–5.9)
RBC # BLD AUTO: 4.11 M/UL (ref 4.5–5.9)
RBC #/AREA URNS AUTO: 39 /HPF
RBC #/AREA URNS AUTO: 465 /HPF
RBC #/AREA URNS AUTO: 59 /HPF
RESP RATE: 12 BPM
RESP RATE: 16 BPM
RH BLOOD TYPE: POSITIVE
RH BLOOD TYPE: POSITIVE
S65C HEMOCHROMATOSIS MUTATION: NEGATIVE
SAO2 % BLDA: 99 % (ref 94–100)
SAO2 % BLDA: >99 % (ref 94–100)
SAO2 % BLDV: 57.5 % (ref 60–85)
SODIUM (BLOOD GAS): 148 MMOL/L (ref 135–145)
SODIUM SERPL-SCNC: 128 MMOL/L (ref 136–144)
SODIUM SERPL-SCNC: 129 MMOL/L (ref 136–144)
SODIUM SERPL-SCNC: 130 MMOL/L (ref 136–144)
SODIUM SERPL-SCNC: 131 MMOL/L (ref 136–144)
SODIUM SERPL-SCNC: 132 MMOL/L (ref 136–144)
SODIUM SERPL-SCNC: 133 MMOL/L (ref 136–144)
SODIUM SERPL-SCNC: 134 MMOL/L (ref 136–144)
SODIUM SERPL-SCNC: 136 MMOL/L (ref 136–144)
SODIUM SERPL-SCNC: 136 MMOL/L (ref 136–144)
SODIUM SERPL-SCNC: 137 MMOL/L (ref 136–144)
SODIUM SERPL-SCNC: 137 MMOL/L (ref 136–144)
SODIUM SERPL-SCNC: 138 MMOL/L (ref 136–144)
SODIUM SERPL-SCNC: 139 MMOL/L (ref 136–144)
SODIUM SERPL-SCNC: 140 MMOL/L (ref 136–144)
SP GR UR STRIP: 1.02 (ref 1–1.03)
SPECIMEN VOL 24H UR: 500 ML
SPECIMEN VOL 24H UR: 550 ML
TIBC SERPL-MCNC: 210 MCG/DL (ref 228–428)
TRANSFERRIN SERPL-MCNC: 159 MG/DL (ref 180–329)
TRIGL SERPL-MCNC: 71 MG/DL (ref 1–149)
TRIGL SERPL-MCNC: 82 MG/DL (ref 1–149)
TRIGL SERPL-MCNC: 86 MG/DL (ref 1–149)
TROPONIN I SERPL-MCNC: 1.65 NG/ML (ref ?–0.03)
TROPONIN I SERPL-MCNC: 2 NG/ML (ref ?–0.03)
TSH SERPL-ACNC: 3.8 UIU/ML (ref 0.34–5.6)
TSH SERPL-ACNC: 4.07 UIU/ML (ref 0.34–5.6)
TSH SERPL-ACNC: 4.69 UIU/ML (ref 0.34–5.6)
UROBILINOGEN UR STRIP-ACNC: 4
UROBILINOGEN UR STRIP-ACNC: <2
UROBILINOGEN UR STRIP-ACNC: <2
VANCOMYCIN TROUGH SERPL-MCNC: 14.8 MCG/ML (ref 10–20)
VARIANT LYMPHS NFR BLD MANUAL: 1 %
VIT B12 SERPL-MCNC: >1500 PG/ML (ref 181–914)
VIT B12 SERPL-MCNC: >1500 PG/ML (ref 181–914)
VIT C UR-MCNC: NEGATIVE MG/DL
VIT C UR-MCNC: NEGATIVE MG/DL
WBC # BLD AUTO: 10.2 K/UL (ref 4–11)
WBC # BLD AUTO: 10.3 K/UL (ref 4–11)
WBC # BLD AUTO: 10.7 K/UL (ref 4–11)
WBC # BLD AUTO: 11.3 K/UL (ref 4–11)
WBC # BLD AUTO: 12.4 K/UL (ref 4–11)
WBC # BLD AUTO: 13.9 K/UL (ref 4–11)
WBC # BLD AUTO: 15.7 K/UL (ref 4–11)
WBC # BLD AUTO: 4.2 K/UL (ref 4–11)
WBC # BLD AUTO: 4.4 K/UL (ref 4–11)
WBC # BLD AUTO: 5.8 K/UL (ref 4–11)
WBC # BLD AUTO: 7.7 K/UL (ref 4–11)
WBC # BLD AUTO: 8.1 K/UL (ref 4–11)
WBC # BLD AUTO: 8.1 K/UL (ref 4–11)
WBC # BLD AUTO: 8.8 K/UL (ref 4–11)
WBC # BLD AUTO: 9.6 K/UL (ref 4–11)
WBC #/AREA URNS AUTO: 32 /HPF
WBC #/AREA URNS AUTO: 5 /HPF
WBC #/AREA URNS AUTO: 66 /HPF

## 2017-01-01 PROCEDURE — 95819 EEG AWAKE AND ASLEEP: CPT | Performed by: OTHER

## 2017-01-01 PROCEDURE — 71010 XR CHEST AP PORTABLE  (CPT=71010): CPT

## 2017-01-01 PROCEDURE — 93306 TTE W/DOPPLER COMPLETE: CPT | Performed by: INTERNAL MEDICINE

## 2017-01-01 PROCEDURE — 99232 SBSQ HOSP IP/OBS MODERATE 35: CPT | Performed by: OTHER

## 2017-01-01 PROCEDURE — 76705 ECHO EXAM OF ABDOMEN: CPT

## 2017-01-01 PROCEDURE — 30233R1 TRANSFUSION OF NONAUTOLOGOUS PLATELETS INTO PERIPHERAL VEIN, PERCUTANEOUS APPROACH: ICD-10-PCS | Performed by: HOSPITALIST

## 2017-01-01 PROCEDURE — 93306 TTE W/DOPPLER COMPLETE: CPT

## 2017-01-01 PROCEDURE — 93880 EXTRACRANIAL BILAT STUDY: CPT

## 2017-01-01 PROCEDURE — 0BH17EZ INSERTION OF ENDOTRACHEAL AIRWAY INTO TRACHEA, VIA NATURAL OR ARTIFICIAL OPENING: ICD-10-PCS | Performed by: EMERGENCY MEDICINE

## 2017-01-01 PROCEDURE — 0BH17EZ INSERTION OF ENDOTRACHEAL AIRWAY INTO TRACHEA, VIA NATURAL OR ARTIFICIAL OPENING: ICD-10-PCS | Performed by: HOSPITALIST

## 2017-01-01 PROCEDURE — 5A1945Z RESPIRATORY VENTILATION, 24-96 CONSECUTIVE HOURS: ICD-10-PCS | Performed by: EMERGENCY MEDICINE

## 2017-01-01 PROCEDURE — 99233 SBSQ HOSP IP/OBS HIGH 50: CPT | Performed by: OTHER

## 2017-01-01 PROCEDURE — 70450 CT HEAD/BRAIN W/O DYE: CPT

## 2017-01-01 PROCEDURE — B5181ZA FLUOROSCOPY OF SUPERIOR VENA CAVA USING LOW OSMOLAR CONTRAST, GUIDANCE: ICD-10-PCS | Performed by: HOSPITALIST

## 2017-01-01 PROCEDURE — 5A1935Z RESPIRATORY VENTILATION, LESS THAN 24 CONSECUTIVE HOURS: ICD-10-PCS | Performed by: HOSPITALIST

## 2017-01-01 PROCEDURE — 76770 US EXAM ABDO BACK WALL COMP: CPT

## 2017-01-01 PROCEDURE — 99223 1ST HOSP IP/OBS HIGH 75: CPT | Performed by: OTHER

## 2017-01-01 PROCEDURE — 02HV33Z INSERTION OF INFUSION DEVICE INTO SUPERIOR VENA CAVA, PERCUTANEOUS APPROACH: ICD-10-PCS | Performed by: HOSPITALIST

## 2017-01-01 PROCEDURE — 3E033XZ INTRODUCTION OF VASOPRESSOR INTO PERIPHERAL VEIN, PERCUTANEOUS APPROACH: ICD-10-PCS | Performed by: HOSPITALIST

## 2017-01-01 RX ORDER — PHYTONADIONE 5 MG/1
5 TABLET ORAL DAILY
Status: DISPENSED | OUTPATIENT
Start: 2017-01-01 | End: 2017-01-01

## 2017-01-01 RX ORDER — IPRATROPIUM BROMIDE AND ALBUTEROL SULFATE 2.5; .5 MG/3ML; MG/3ML
3 SOLUTION RESPIRATORY (INHALATION) EVERY 4 HOURS PRN
Status: DISCONTINUED | OUTPATIENT
Start: 2017-01-01 | End: 2017-01-01

## 2017-01-01 RX ORDER — DOPAMINE HYDROCHLORIDE 160 MG/100ML
INJECTION, SOLUTION INTRAVENOUS CONTINUOUS
Status: DISCONTINUED | OUTPATIENT
Start: 2017-01-01 | End: 2017-01-01

## 2017-01-01 RX ORDER — ARIPIPRAZOLE 15 MG/1
40 TABLET ORAL EVERY 4 HOURS
Status: DISCONTINUED | OUTPATIENT
Start: 2017-01-01 | End: 2017-01-01

## 2017-01-01 RX ORDER — SODIUM CHLORIDE 9 MG/ML
INJECTION, SOLUTION INTRAVENOUS
Status: DISPENSED
Start: 2017-01-01 | End: 2017-01-01

## 2017-01-01 RX ORDER — FUROSEMIDE 10 MG/ML
40 INJECTION INTRAMUSCULAR; INTRAVENOUS
Status: DISCONTINUED | OUTPATIENT
Start: 2017-01-01 | End: 2017-01-01

## 2017-01-01 RX ORDER — METHYLPREDNISOLONE SODIUM SUCCINATE 125 MG/2ML
60 INJECTION, POWDER, LYOPHILIZED, FOR SOLUTION INTRAMUSCULAR; INTRAVENOUS EVERY 24 HOURS
Status: DISCONTINUED | OUTPATIENT
Start: 2017-01-01 | End: 2017-01-01

## 2017-01-01 RX ORDER — METOCLOPRAMIDE HYDROCHLORIDE 5 MG/ML
10 INJECTION INTRAMUSCULAR; INTRAVENOUS EVERY 8 HOURS PRN
Status: DISCONTINUED | OUTPATIENT
Start: 2017-01-01 | End: 2017-01-01

## 2017-01-01 RX ORDER — SODIUM CHLORIDE 0.9 % (FLUSH) 0.9 %
SYRINGE (ML) INJECTION
Status: DISPENSED
Start: 2017-01-01 | End: 2017-01-01

## 2017-01-01 RX ORDER — FUROSEMIDE 20 MG/1
20 TABLET ORAL DAILY
Status: DISCONTINUED | OUTPATIENT
Start: 2017-01-01 | End: 2017-01-01

## 2017-01-01 RX ORDER — CASTOR OIL AND BALSAM, PERU 788; 87 MG/G; MG/G
OINTMENT TOPICAL 2 TIMES DAILY PRN
Status: DISCONTINUED | OUTPATIENT
Start: 2017-01-01 | End: 2017-01-01

## 2017-01-01 RX ORDER — SODIUM CHLORIDE 9 MG/ML
INJECTION, SOLUTION INTRAVENOUS CONTINUOUS
Status: DISCONTINUED | OUTPATIENT
Start: 2017-01-01 | End: 2017-01-01

## 2017-01-01 RX ORDER — LORAZEPAM 2 MG/ML
2 INJECTION INTRAMUSCULAR EVERY 30 MIN PRN
Status: DISCONTINUED | OUTPATIENT
Start: 2017-01-01 | End: 2017-01-01

## 2017-01-01 RX ORDER — METHYLPREDNISOLONE SODIUM SUCCINATE 125 MG/2ML
60 INJECTION, POWDER, LYOPHILIZED, FOR SOLUTION INTRAMUSCULAR; INTRAVENOUS EVERY 12 HOURS
Status: DISCONTINUED | OUTPATIENT
Start: 2017-01-01 | End: 2017-01-01

## 2017-01-01 RX ORDER — DOPAMINE HYDROCHLORIDE 160 MG/100ML
INJECTION, SOLUTION INTRAVENOUS
Status: DISCONTINUED
Start: 2017-01-01 | End: 2017-01-01

## 2017-01-01 RX ORDER — MELATONIN
100 DAILY
Status: DISCONTINUED | OUTPATIENT
Start: 2017-01-01 | End: 2017-01-01

## 2017-01-01 RX ORDER — DEXTROSE MONOHYDRATE 25 G/50ML
50 INJECTION, SOLUTION INTRAVENOUS ONCE
Status: COMPLETED | OUTPATIENT
Start: 2017-01-01 | End: 2017-01-01

## 2017-01-01 RX ORDER — SODIUM CHLORIDE 0.9 % (FLUSH) 0.9 %
10 SYRINGE (ML) INJECTION AS NEEDED
Status: DISCONTINUED | OUTPATIENT
Start: 2017-01-01 | End: 2017-01-01

## 2017-01-01 RX ORDER — SODIUM CHLORIDE 9 MG/ML
INJECTION, SOLUTION INTRAVENOUS ONCE
Status: DISCONTINUED | OUTPATIENT
Start: 2017-01-01 | End: 2017-01-01

## 2017-01-01 RX ORDER — MORPHINE SULFATE 2 MG/ML
2 INJECTION, SOLUTION INTRAMUSCULAR; INTRAVENOUS
Status: DISCONTINUED | OUTPATIENT
Start: 2017-01-01 | End: 2017-01-01

## 2017-01-01 RX ORDER — SODIUM CHLORIDE 9 MG/ML
INJECTION, SOLUTION INTRAVENOUS
Status: COMPLETED
Start: 2017-01-01 | End: 2017-01-01

## 2017-01-01 RX ORDER — METOCLOPRAMIDE HYDROCHLORIDE 5 MG/ML
5 INJECTION INTRAMUSCULAR; INTRAVENOUS EVERY 8 HOURS PRN
Status: DISCONTINUED | OUTPATIENT
Start: 2017-01-01 | End: 2017-01-01

## 2017-01-01 RX ORDER — FUROSEMIDE 10 MG/ML
20 INJECTION INTRAMUSCULAR; INTRAVENOUS ONCE
Status: COMPLETED | OUTPATIENT
Start: 2017-01-01 | End: 2017-01-01

## 2017-01-01 RX ORDER — POTASSIUM CHLORIDE 29.8 MG/ML
40 INJECTION INTRAVENOUS ONCE
Status: COMPLETED | OUTPATIENT
Start: 2017-01-01 | End: 2017-01-01

## 2017-01-01 RX ORDER — POTASSIUM CHLORIDE 20 MEQ/1
40 TABLET, EXTENDED RELEASE ORAL EVERY 4 HOURS
Status: COMPLETED | OUTPATIENT
Start: 2017-01-01 | End: 2017-01-01

## 2017-01-01 RX ORDER — LORAZEPAM 1 MG/1
1 TABLET ORAL
Status: DISCONTINUED | OUTPATIENT
Start: 2017-01-01 | End: 2017-01-01

## 2017-01-01 RX ORDER — SODIUM CHLORIDE 0.9 % (FLUSH) 0.9 %
SYRINGE (ML) INJECTION
Status: COMPLETED
Start: 2017-01-01 | End: 2017-01-01

## 2017-01-01 RX ORDER — LIDOCAINE HYDROCHLORIDE 10 MG/ML
0.5 INJECTION, SOLUTION INFILTRATION; PERINEURAL ONCE AS NEEDED
Status: ACTIVE | OUTPATIENT
Start: 2017-01-01 | End: 2017-01-01

## 2017-01-01 RX ORDER — LORAZEPAM 2 MG/ML
INJECTION INTRAMUSCULAR
Status: DISCONTINUED
Start: 2017-01-01 | End: 2017-01-01 | Stop reason: WASHOUT

## 2017-01-01 RX ORDER — FUROSEMIDE 10 MG/ML
20 INJECTION INTRAMUSCULAR; INTRAVENOUS
Status: DISCONTINUED | OUTPATIENT
Start: 2017-01-01 | End: 2017-01-01

## 2017-01-01 RX ORDER — THIAMINE HYDROCHLORIDE 100 MG/ML
100 INJECTION, SOLUTION INTRAMUSCULAR; INTRAVENOUS DAILY
Status: DISCONTINUED | OUTPATIENT
Start: 2017-01-01 | End: 2017-01-01

## 2017-01-01 RX ORDER — DEXTROSE MONOHYDRATE 100 MG/ML
INJECTION, SOLUTION INTRAVENOUS CONTINUOUS PRN
Status: DISCONTINUED | OUTPATIENT
Start: 2017-01-01 | End: 2017-01-01

## 2017-01-01 RX ORDER — SODIUM CHLORIDE, SODIUM LACTATE, POTASSIUM CHLORIDE, CALCIUM CHLORIDE 600; 310; 30; 20 MG/100ML; MG/100ML; MG/100ML; MG/100ML
INJECTION, SOLUTION INTRAVENOUS CONTINUOUS
Status: DISCONTINUED | OUTPATIENT
Start: 2017-01-01 | End: 2017-01-01

## 2017-01-01 RX ORDER — ONDANSETRON 2 MG/ML
4 INJECTION INTRAMUSCULAR; INTRAVENOUS EVERY 6 HOURS PRN
Status: DISCONTINUED | OUTPATIENT
Start: 2017-01-01 | End: 2017-01-01

## 2017-01-01 RX ORDER — ALBUMIN, HUMAN INJ 5% 5 %
250 SOLUTION INTRAVENOUS ONCE
Status: COMPLETED | OUTPATIENT
Start: 2017-01-01 | End: 2017-01-01

## 2017-01-01 RX ORDER — LACTULOSE 10 G/15ML
20 SOLUTION ORAL 3 TIMES DAILY
Status: DISCONTINUED | OUTPATIENT
Start: 2017-01-01 | End: 2017-01-01

## 2017-01-01 RX ORDER — MORPHINE SULFATE IN 0.9 % NACL 1 MG/ML
2 PLASTIC BAG, INJECTION (ML) INTRAVENOUS CONTINUOUS PRN
Status: DISCONTINUED | OUTPATIENT
Start: 2017-01-01 | End: 2017-01-01

## 2017-01-01 RX ORDER — SODIUM PHOSPHATE, DIBASIC AND SODIUM PHOSPHATE, MONOBASIC 7; 19 G/133ML; G/133ML
1 ENEMA RECTAL ONCE AS NEEDED
Status: ACTIVE | OUTPATIENT
Start: 2017-01-01 | End: 2017-01-01

## 2017-01-01 RX ORDER — FUROSEMIDE 10 MG/ML
20 INJECTION INTRAMUSCULAR; INTRAVENOUS 3 TIMES DAILY
Status: DISCONTINUED | OUTPATIENT
Start: 2017-01-01 | End: 2017-01-01

## 2017-01-01 RX ORDER — HEPARIN SODIUM 5000 [USP'U]/ML
5000 INJECTION, SOLUTION INTRAVENOUS; SUBCUTANEOUS EVERY 12 HOURS
Status: DISCONTINUED | OUTPATIENT
Start: 2017-01-01 | End: 2017-01-01

## 2017-01-01 RX ORDER — METHYLPREDNISOLONE SODIUM SUCCINATE 40 MG/ML
40 INJECTION, POWDER, LYOPHILIZED, FOR SOLUTION INTRAMUSCULAR; INTRAVENOUS DAILY
Status: DISCONTINUED | OUTPATIENT
Start: 2017-01-01 | End: 2017-01-01

## 2017-01-01 RX ORDER — MAGNESIUM OXIDE 400 MG (241.3 MG MAGNESIUM) TABLET
400 TABLET ONCE
Status: DISCONTINUED | OUTPATIENT
Start: 2017-01-01 | End: 2017-01-01

## 2017-01-01 RX ORDER — FUROSEMIDE 10 MG/ML
20 INJECTION INTRAMUSCULAR; INTRAVENOUS ONCE
Status: DISCONTINUED | OUTPATIENT
Start: 2017-01-01 | End: 2017-01-01

## 2017-01-01 RX ORDER — SUCCINYLCHOLINE CHLORIDE 20 MG/ML
100 INJECTION INTRAMUSCULAR; INTRAVENOUS ONCE
Status: COMPLETED | OUTPATIENT
Start: 2017-01-01 | End: 2017-01-01

## 2017-01-01 RX ORDER — GLYCOPYRROLATE 0.2 MG/ML
0.4 INJECTION, SOLUTION INTRAMUSCULAR; INTRAVENOUS
Status: DISCONTINUED | OUTPATIENT
Start: 2017-01-01 | End: 2017-01-01

## 2017-01-01 RX ORDER — MORPHINE SULFATE 2 MG/ML
1 INJECTION, SOLUTION INTRAMUSCULAR; INTRAVENOUS EVERY 2 HOUR PRN
Status: DISCONTINUED | OUTPATIENT
Start: 2017-01-01 | End: 2017-01-01

## 2017-01-01 RX ORDER — BISACODYL 10 MG
10 SUPPOSITORY, RECTAL RECTAL
Status: DISCONTINUED | OUTPATIENT
Start: 2017-01-01 | End: 2017-01-01

## 2017-01-01 RX ORDER — POTASSIUM CHLORIDE 20 MEQ/1
40 TABLET, EXTENDED RELEASE ORAL ONCE
Status: DISCONTINUED | OUTPATIENT
Start: 2017-01-01 | End: 2017-01-01

## 2017-01-01 RX ORDER — LORAZEPAM 1 MG/1
1 TABLET ORAL EVERY 6 HOURS
Status: DISPENSED | OUTPATIENT
Start: 2017-01-01 | End: 2017-01-01

## 2017-01-01 RX ORDER — DEXTROSE MONOHYDRATE 25 G/50ML
50 INJECTION, SOLUTION INTRAVENOUS AS NEEDED
Status: DISCONTINUED | OUTPATIENT
Start: 2017-01-01 | End: 2017-01-01

## 2017-01-01 RX ORDER — MORPHINE SULFATE 2 MG/ML
2 INJECTION, SOLUTION INTRAMUSCULAR; INTRAVENOUS EVERY 2 HOUR PRN
Status: DISCONTINUED | OUTPATIENT
Start: 2017-01-01 | End: 2017-01-01

## 2017-01-01 RX ORDER — MORPHINE SULFATE 4 MG/ML
4 INJECTION, SOLUTION INTRAMUSCULAR; INTRAVENOUS EVERY 2 HOUR PRN
Status: DISCONTINUED | OUTPATIENT
Start: 2017-01-01 | End: 2017-01-01

## 2017-01-01 RX ORDER — ETOMIDATE 2 MG/ML
20 INJECTION INTRAVENOUS ONCE
Status: COMPLETED | OUTPATIENT
Start: 2017-01-01 | End: 2017-01-01

## 2017-01-01 RX ORDER — LACTULOSE 10 G/15ML
20 SOLUTION ORAL EVERY 6 HOURS PRN
Status: DISCONTINUED | OUTPATIENT
Start: 2017-01-01 | End: 2017-01-01

## 2017-01-01 RX ORDER — LORAZEPAM 2 MG/1
2 TABLET ORAL
Status: DISCONTINUED | OUTPATIENT
Start: 2017-01-01 | End: 2017-01-01

## 2017-01-01 RX ORDER — PANTOPRAZOLE SODIUM 40 MG/1
40 TABLET, DELAYED RELEASE ORAL
Status: DISCONTINUED | OUTPATIENT
Start: 2017-01-01 | End: 2017-01-01

## 2017-01-01 RX ORDER — ARIPIPRAZOLE 15 MG/1
20 TABLET ORAL ONCE
Status: COMPLETED | OUTPATIENT
Start: 2017-01-01 | End: 2017-01-01

## 2017-01-01 RX ORDER — LORAZEPAM 2 MG/ML
2 INJECTION INTRAMUSCULAR
Status: DISCONTINUED | OUTPATIENT
Start: 2017-01-01 | End: 2017-01-01

## 2017-01-01 RX ORDER — DIAZEPAM 5 MG/1
5 TABLET ORAL ONCE
Status: COMPLETED | OUTPATIENT
Start: 2017-01-01 | End: 2017-01-01

## 2017-01-01 RX ORDER — POLYETHYLENE GLYCOL 3350 17 G/17G
17 POWDER, FOR SOLUTION ORAL DAILY PRN
Status: DISCONTINUED | OUTPATIENT
Start: 2017-01-01 | End: 2017-01-01

## 2017-01-01 RX ORDER — DIAZEPAM 5 MG/1
5 TABLET ORAL EVERY 6 HOURS SCHEDULED
Status: DISCONTINUED | OUTPATIENT
Start: 2017-01-01 | End: 2017-01-01

## 2017-01-01 RX ORDER — CHLORHEXIDINE GLUCONATE 0.12 MG/ML
15 RINSE ORAL
Status: DISCONTINUED | OUTPATIENT
Start: 2017-01-01 | End: 2017-01-01

## 2017-01-01 RX ORDER — DEXTROSE, SODIUM CHLORIDE, AND POTASSIUM CHLORIDE 5; .45; .15 G/100ML; G/100ML; G/100ML
INJECTION INTRAVENOUS CONTINUOUS
Status: DISCONTINUED | OUTPATIENT
Start: 2017-01-01 | End: 2017-01-01

## 2017-01-01 RX ORDER — NYSTATIN 100000 U/G
OINTMENT TOPICAL 2 TIMES DAILY
Status: DISCONTINUED | OUTPATIENT
Start: 2017-01-01 | End: 2017-01-01

## 2017-01-01 RX ORDER — LORAZEPAM 2 MG/ML
1 INJECTION INTRAMUSCULAR
Status: DISCONTINUED | OUTPATIENT
Start: 2017-01-01 | End: 2017-01-01

## 2017-01-01 RX ORDER — FUROSEMIDE 10 MG/ML
INJECTION INTRAMUSCULAR; INTRAVENOUS
Status: COMPLETED
Start: 2017-01-01 | End: 2017-01-01

## 2017-01-01 RX ORDER — 0.9 % SODIUM CHLORIDE 0.9 %
VIAL (ML) INJECTION
Status: COMPLETED
Start: 2017-01-01 | End: 2017-01-01

## 2017-01-01 RX ORDER — DEXTROSE MONOHYDRATE 25 G/50ML
INJECTION, SOLUTION INTRAVENOUS
Status: COMPLETED
Start: 2017-01-01 | End: 2017-01-01

## 2017-01-01 RX ORDER — TRAZODONE HYDROCHLORIDE 50 MG/1
50 TABLET ORAL NIGHTLY PRN
Status: DISCONTINUED | OUTPATIENT
Start: 2017-01-01 | End: 2017-01-01

## 2017-01-01 RX ORDER — DEXTROSE AND SODIUM CHLORIDE 5; .9 G/100ML; G/100ML
INJECTION, SOLUTION INTRAVENOUS CONTINUOUS
Status: DISCONTINUED | OUTPATIENT
Start: 2017-01-01 | End: 2017-01-01 | Stop reason: ALTCHOICE

## 2017-01-01 RX ORDER — DOCUSATE SODIUM 100 MG/1
100 CAPSULE, LIQUID FILLED ORAL 2 TIMES DAILY
Status: DISCONTINUED | OUTPATIENT
Start: 2017-01-01 | End: 2017-01-01

## 2017-01-01 RX ORDER — SODIUM CHLORIDE 9 MG/ML
INJECTION, SOLUTION INTRAVENOUS ONCE
Status: COMPLETED | OUTPATIENT
Start: 2017-01-01 | End: 2017-01-01

## 2017-01-30 PROBLEM — R73.9 HYPERGLYCEMIA: Status: ACTIVE | Noted: 2017-01-01

## 2017-01-30 PROBLEM — K75.9 HEPATITIS: Status: ACTIVE | Noted: 2017-01-01

## 2017-01-30 PROBLEM — E87.2 METABOLIC ACIDOSIS: Status: ACTIVE | Noted: 2017-01-01

## 2017-01-30 PROBLEM — E87.1 HYPONATREMIA: Status: ACTIVE | Noted: 2017-01-01

## 2017-01-30 PROBLEM — E16.2 HYPOGLYCEMIA: Status: ACTIVE | Noted: 2017-01-01

## 2017-01-30 PROBLEM — E87.6 HYPOKALEMIA: Status: ACTIVE | Noted: 2017-01-01

## 2017-01-30 PROBLEM — J18.9 COMMUNITY ACQUIRED PNEUMONIA: Status: ACTIVE | Noted: 2017-01-01

## 2017-01-30 NOTE — H&P
[facility]    History & Physical (or consult)    Mehdi Daniels Patient Status:  Emergency    1940 MRN [id number]   Location [address] Attending Sasha Portillo MD   [facility] Day # 0 extraocular muscles intact  Lungs -  clear bilaterally, normal respiratory effort  cv-  RRR,  LE edema,   abd-  soft, thin, nontender, nondistended,  bowel sounds present  Skin- no diffuse rash   msk -   no joint tenderness, effusions or redness noted  Aurelia hepatitis v other  -elevated ast to 1161, alt to 426, t bili to 6.9  -abnormal cxr, cough, possible pneumonia  -daily EtOH use, concern for alcoholism  -tobacco abuse  -dehydration, elevated Cr - ADELA v CKD  -hyponatremia  -severe protein calorie malnutriti

## 2017-01-30 NOTE — ED INITIAL ASSESSMENT (HPI)
Via medics from home, wife called for slurred speech    Blood sugar 33 upon arrival, IM glucagon given.  Hypotensive    Patient has not seen a doctor in over 50 years

## 2017-01-30 NOTE — CONSULTS
[facility] HOSP - [facility]    Report of Consultation    Sergo Best Patient Status:  Emergency    1940 MRN [id number]   Location [address] Attending Chaparrita Sprague MD   Hosp Day # 0 PCP None Pcp     Date of Admissio Facility-Administered Medications:  Piperacillin Sod-Tazobactam So (ZOSYN) 3.375 g in dextrose 5 % 100 mL IVPB 3.375 g Intravenous Q8H   Piperacillin Sod-Tazobactam So (ZOSYN) 3.375 g in dextrose 5 % 100 mL IVPB 3.375 g Intravenous Once   dextrose 5 % and 01/30/2017   PTT 59.8* 01/30/2017   INR 3.0* 01/30/2017   PTP 31.0* 01/30/2017   TSH 4.69 01/30/2017         Imaging:  Xr Chest Ap Portable  (cpt=71010)    1/30/2017  PROCEDURE: XR CHEST AP PORTABLE (CPT=71010) TIME: 1044 hr.   COMPARISON: None.   INDICATIO

## 2017-01-30 NOTE — PROGRESS NOTES
[facility] Pharmacy Note:  Renal Dose Adjustment    Pat Hendricks has been prescribed metoclopramide (REGLAN) 10mg intravenously every 8 hours prn n/v    Estimated Creatinine Clearance: 28.3 mL/min (based on Cr of 1.92).     His calculated creatinine clearance is

## 2017-01-31 NOTE — PLAN OF CARE
Problem: Patient Centered Care  Goal: Patient preferences are identified and integrated in the patient’s plan of care  Interventions:  - What would you like us to know as we care for you?  - Provide timely, complete, and accurate information to patient/fam oxygenation  INTERVENTIONS:  - Assess for changes in respiratory status  - Assess for changes in mentation and behavior  - Position to facilitate oxygenation and minimize respiratory effort  - Oxygen supplementation based on oxygen saturation or ABGs  - Pr risk of falls.   - [place] fall precautions as indicated by assessment.  - Educate pt/family on patient safety including physical limitations  - Instruct pt to call for assistance with activity based on assessment  - Modify environment to reduce risk of i

## 2017-01-31 NOTE — PROGRESS NOTES
[address]    Progress Note    Jayne Yanes Patient Status:  Inpatient    1940 MRN [id number]   Location [facility] 5SW/SE Attending Roberta Best MD   Hosp Day # 1 PCP None Pcp     Date of Adm with any questions/concerns.     Griffin Trivedi MD  1/31/2017      Subjective:   Cc: follow up hospitalization    Jose Alfredo Sweeney is a(n) 77year old male     Feels about the same today, no complaints    Objective:   Blood pressure 86/41, pulse 90, temp LORazepam (ATIVAN) tab 2 mg 2 mg Oral Q1H PRN   Or      LORazepam (ATIVAN) injection 2 mg 2 mg Intravenous Q1H PRN   Metoclopramide HCl (REGLAN) injection 5 mg 5 mg Intravenous Q8H PRN       Lab Data:  Reviewed in [facility]  Recent Labs   Lab  01/30/17   1015 opacity which could represent atelectasis or pneumonia/infiltrate.

## 2017-01-31 NOTE — PROGRESS NOTES
[facility]  Gastroenterology Progress Note    Jacquelyn James Patient Status:  Inpatient    1940 MRN [id number]   Location [facility] 5SW/SE Attending Sidra Chamberlain MD   Hosp Day # 1 PCP None Pcp     Subjective:  Jacquelyn James Portable  (cpt=71010)    1/30/2017  CONCLUSION:  1. Cardiomegaly with moderate vascular congestion and pleural effusion. Correlate for CHF and/or increased fluid volume status of the patient.  2.  Small left basal pulmonary opacity which could represent a

## 2017-01-31 NOTE — PROGRESS NOTES
Patient arrive to the unit around [address]. Skin check done, vitals done. asseessment done. Spoke with Dr. María Elena Moyer who ordered a diet; however does not want accuchecks on patient.  Can do as needed per MD

## 2017-02-01 NOTE — DISCHARGE PLANNING
SW called RN who stated that pt still has some confusion at this time. SW attempted to call pt's wife/Laura, but received no answer; VM left for pt's wife. Pt has hx of ETOH. SW to provide appropriate resources when appropriate.     Jonathan Reynoso, LSW ext 1

## 2017-02-01 NOTE — PROGRESS NOTES
[facility]  Gastroenterology Progress Note    Amadou Blanco Patient Status:  Inpatient    1940 MRN [id number]   Location [facility] 5SW/SE Attending Olga Harris MD   [facility] Day # 2 PCP None Pcp     Subjective:  Amadou Blanco Correlate for CHF and/or increased fluid volume status of the patient. 2.  Small left basal pulmonary opacity which could represent atelectasis or pneumonia/infiltrate.         Problem list:  Patient Active Problem List:     Hypoglycemia     Hyponatremia

## 2017-02-01 NOTE — PROGRESS NOTES
Patient today has been much more tired and drowsy. RN did speak with MD prior to rounding on patient and stated patient looks worse compared to yesterday with lower blood pressures. MD continues to monitor patient.  1 L bolus of fluids given and a scheduled

## 2017-02-01 NOTE — PROGRESS NOTES
[facility] dosing service    Initial Pharmacokinetic Consult for Vancomycin Dosing     Tawnya Benitez is a 77year old male who is being treated for pneumonia. Pharmacy has been asked to dose Vancomycin by Dr. Rob Hu    He has No Known Allergies.     O

## 2017-02-01 NOTE — PROGRESS NOTES
[facility] Hospitalist Progress Note     PCP: None Pcp    CC: Follow up, ETOH hepatitis, cirrhosis       Assessment/Plan:     Mr Derek Biggs is a 77year old male long time smoker, daily beer drinker, no known medical problems, has not seen a doctor in many decades a syndrome  -stable, cont to monitor    FEN  -IVF at 100, per CC  -lytes in Am  -NPO given acute issues above, speech eval    Prophylaxis:   DVT with SCD, plts in the 30's, INR elevated due to liver disease  Atrophy Prophylaxis PT/OT when able, speech tx,  L appropriate the more you talk with him. Knows his ex wife, that it is 2017, knows that he is sick  But cannot give specifics. Clearly states he wants Laura making decisions for him. Denies pain. Feels SOB.   Exam limited by respiratory status and confu 1232  01/30/17   1746   PGLU  <30*  77  84  104*  89       No results for input(s): TROP in the last 72 hours. Imaging:f    CXR still pending      Ct Brain Or Head (60632)    1/30/2017  PROCEDURE: CT BRAIN OR HEAD (CPT=70450)  COMPARISON: None.   INDICAT gallbladder wall thickening. A sonographic Kang's sign was not elicited with this exam. BILE DUCTS:   Normal.  Common bile duct measures this-mm. OTHER:   There is small measuring 12.0 cm in length. It has a nodular contour and coarsened echotexture.

## 2017-02-01 NOTE — PLAN OF CARE
Problem: Patient Centered Care  Goal: Patient preferences are identified and integrated in the patient’s plan of care  Interventions:  - What would you like us to know as we care for you?  - Provide timely, complete, and accurate information to patient/fam Progressing  bp continues to run low, md aware, cont to monitor.   Goal: Absence of cardiac arrhythmias or at baseline  INTERVENTIONS:  - Continuous cardiac monitoring, monitor vital signs, obtain 12 lead EKG if indicated  - Evaluate effectiveness of antiar intact  INTERVENTIONS  - Assess and document risk factors for pressure ulcer development  - Assess and document skin integrity  - Monitor for areas of redness and/or skin breakdown  - Initiate interventions, skin care algorithm/standards of care as needed

## 2017-02-01 NOTE — CONSULTS
Critical Care Consult     Assessment / Plan:  1. Encephalopathy: multifactorial due to alcoholic cirrhosis, aspiration and possible sepsis. CT brain with no acute process  - check ammonia and lactic acid  - ABG ordered  - abx as below  2.  Alcoholic hepatit Types: Cigarettes    Smokeless tobacco: Not on file    Alcohol Use: Yes    Drug Use: No    Sexual Activity: Not on file   Not on file  Other Topics Concern   None on file     Social History Narrative   None on file       Family History   Problem Relation A

## 2017-02-01 NOTE — SLP NOTE
ADULT SWALLOWING EVALUATION    ASSESSMENT & PLAN   ASSESSMENT  Pt seen for bedside swallowing evaluation per MD order secondary to new onset of pneumonia and coughing with po. Pt with reduced alertness.   He would open eyes to his name, but constant cueing functional communication measures, pt's swallow level is 3/7.          PLAN   Diet Recommendations - Solids: Puree  Diet Recommendations - Liquid: Nectar thick                        Treatment Plan: Dysphagia therapy (Trial solids when cleared by MD.  )  Lillian Ulloa Formation: Impaired (Minimal labial spillage on Right side with thin liquids)  Bolus Propulsion: Impaired  Mastication:  (Not tested secondary to being on a clear liquid diet.)       Pharyngeal Phase of Swallow: Impaired  Laryngeal Elevation Timing: Impair Pathologist  [address] [facility]  EXT.  [id number]

## 2017-02-01 NOTE — PLAN OF CARE
Problem: DRUG ABUSE/DETOX  Goal: Will have no detox symptoms and will verbalize plan for changing drug-related behavior  INTERVENTIONS:  - Administer medication as ordered  - Monitor physical status  - Provide emotional support with 1:1 interaction with

## 2017-02-01 NOTE — PROGRESS NOTES
02/01/17 1420   Clinical Encounter Type   Visited With Family   Routine Visit Introduction   Continue Visiting Yes   Surgical Visit Pre-op   Crisis Visit Critical care   Patient's Supportive Strategies/Resources family   Referral From Nurse   Referral T

## 2017-02-02 NOTE — SLP NOTE
ADULT SWALLOWING EVALUATION    ASSESSMENT & PLAN   ASSESSMENT  Pt with decline in medical status and transfer to ICU. New SLP orders rec'd for raised concern of aspiration. Pt seen sitting upright in bed for all PO trials. Pt with poor natural dentition.  P Upright    Oral Phase of Swallow: Impaired  Bolus Retrieval: Intact  Bilabial Seal: Impaired  Bolus Formation: Impaired  Bolus Propulsion: Impaired  Mastication:  (Not tested due to suspicion for underlying pneumonia)       Pharyngeal Phase of Swallow: Imp diet dariana and monitoring with VFSS to R/O aspiration pending MD orders. Collaborated results with RN who will obtain new orders.     FOLLOW UP  Treatment Plan: Dysphagia therapy;Videofluoroscopic swallow study (Pending MD orders)  Number of Visits to Meet Es

## 2017-02-02 NOTE — DIETARY NOTE
ADULT NUTRITION INITIAL ASSESSMENT    Pt is at moderate nutrition risk based on prolonged poor po and reported wt loss. Pt meets malnutrition criteria.       CRITERIA FOR MALNUTRITION DIAGNOSIS:  Criteria for non-severe malnutrition diagnosis: social/envir ANTHROPOMETRICS:  HT: 180.3 cm (5' 11\")    WT: 67.858 kg (149 lb 9.6 oz); 139# ( 1/31)----wt change/gain likely due to fluids. BMI: Body mass index is 20.87 kg/(m^2).          BMI Classification: 19-24.9 kg/m2 - WNL  IBW: 172#         81% IBW     Alexis Rossana

## 2017-02-02 NOTE — DISCHARGE PLANNING
SW received phone call from Dr. Salazar Neff stating that pt would benefit from SNF upon d/c from hospital. Pt does have hx of ETOH.  SW contacted pt's wife/Laura who stated that she is agreeable w/ SNF placement, but would like to tour facilities prior to r

## 2017-02-02 NOTE — PROGRESS NOTES
02/02/17 1087   Clinical Encounter Type   Visited With Patient   Routine Visit Follow-up   Continue Visiting Yes   Crisis Visit Critical care  (stroke alert)     Pt unable to communicate, hospitalist in touch with pt's wife.   Will follow up with family

## 2017-02-02 NOTE — PLAN OF CARE
RRT    *See RRT Documentation Record*    Reason the RRT was called:  Patient having dark brown emesis - appeared to be aspirating. Assessment of patient leading up to RRT: Patient with increased confusion. Very drowsy. Difficulty swallowing.  Having incre

## 2017-02-02 NOTE — PLAN OF CARE
CARDIOVASCULAR - ADULT    • Maintains optimal cardiac output and hemodynamic stability Progressing    • Absence of cardiac arrhythmias or at baseline Progressing        DRUG ABUSE/DETOX    • Will have no detox symptoms and will verbalize plan for changing

## 2017-02-02 NOTE — PROGRESS NOTES
[facility] Hospitalist Progress Note     PCP: None Pcp    CC: Follow up, ETOH hepatitis, cirrhosis       Assessment/Plan:     Mr Jeff Gonzales is a 77year old male long time smoker, daily beer drinker, no known medical problems, has not seen a doctor in many decades a appropriate outpt care  -Discussed with Dr Genevieve Bah, agree with plan above  -SW for assistance-->anticipate HAMAMD  -needs to stop drinking, stressed with family, pt in denial, only reports 1 drink a day, confirmed w family and friend that is not the case    ADELA president, answering questions much more easily  Neck  Supple, no JVD  Pulm: BS improved b/l, still course  CV: Heart with regular rate and rhythm, No murmurs, rubs, gallops  Abd: Abdomen soft, nontender, mildly distended, no HSM noted, bowel sounds presen --    GLU  162*   --    --   187*  202*       Recent Labs   Lab  01/31/17   0544  02/01/17   0615  02/02/17   0538   ALT  437*  347*  268*   AST  998*  662*  410*   ALB  2.7*  2.6*  2.6*       Recent Labs   Lab  01/30/17   1232  01/30/17   1746  02/02/17

## 2017-02-02 NOTE — PROGRESS NOTES
Marita Pascual Patient Status:  Inpatient    1940 MRN [id number]   Location [facility] 2W/SW Attending Deangelo Chavez MD   Hosp Day # 3 PCP None Pcp     Critical Care Progress Note    Assessment/Plan:  1.  Encephalopathy: multifactorial lips, mucosa, tongue normal. Mallampati II   Lungs: Clear to auscultation bilaterally, no focal wheezes or crackles    Chest wall: No tenderness or deformity. Heart: Regular rate and rhythm, normal S1S2, no murmur.    Abdomen: soft, non-tender, non-disten

## 2017-02-02 NOTE — PROGRESS NOTES
[facility]  Gastroenterology Progress Note    Nelson Hines Patient Status:  Inpatient    1940 MRN [id number]   Location [facility] 2W/SW Attending Amanda Cooper MD   [facility] Day # 3 PCP None Pcp     Subjective:  Nelson Hines is a(

## 2017-02-02 NOTE — PROGRESS NOTES
Called to bedside by RN for decreased responsiveness. Per report speech eval with coughing. Last reported normal 20 minutes prior.  On my arrival patient eyes shut, pupils pinpoint and minimally reactive, does not respond to voice, moves lower extremities t

## 2017-02-02 NOTE — CONSULTS
[facility]    Report of Consultation    Samia Villa Patient Status:  Inpatient    1940 MRN [id number]   Location [facility] 2W/SW Attending Ovidio Gomez MD   [facility] Day # 3 PCP None Pcp     Date of Admission:  2017 does smoke and use alcohol, but he is unable to quantitate his use at the present time.        Current Medications:    Current Facility-Administered Medications:  sodium chloride 0.9 % infusion      sodium chloride 0.9% IV bolus 2,037 mL 30 mL/kg Intravenou mg Intravenous Q8H PRN       No prescriptions prior to admission    Allergies  No Known Allergies    Review of Systems:   Review of systems is unobtainable from the patient.   NEURO: As in HPI    Physical Exam:   Blood pressure 111/68, pulse 91, temperature follow simple commands, and he moves all 4 extremities well. Eventual diagnosis would include a possible seizure, versus vertebrobasilar insufficiency. There is no CT evidence of posterior fossa ischemia, and clinically he is improving.   CT angiogram was

## 2017-02-03 NOTE — PROGRESS NOTES
Fran Lim Patient Status:  Inpatient    1940 MRN [id number]   Location [facility] 2W/SW Attending Hallie Bello MD   [address] Day # 4 PCP None Pcp     Critical Care Progress Note      Assessment/Plan:  1.  Respiratory failure - intubated for • sodium chloride 0.9%  30 mL/kg Intravenous Once   • piperacillin-tazobactam  3.375 g Intravenous Q8H   • Thiamine HCl  100 mg Intramuscular Daily   • bisacodyl  20 mg Oral Once   • pantoprazole (PROTONIX) IV push  40 mg Intravenous Q12H   • vancomycin 31*   QFLTB1B  138*   ABGHCO3  13.4*   TEMP  36.0   SITE  Right Radial     Imaging: I independently visualized all relevant chest imaging in PACS, agree with radiology interpretation except where noted.

## 2017-02-03 NOTE — SLP NOTE
Pt intubated. SLP to place on HOLD for speech/swallow tx at this time. Will need new orders for BSSE/VFSS as appropriate upon extubation. RN Brenda aware of SLP POC.     Promise Honeycutt MA, [address] [facility]  Speech-Language Pathologist  Jt Lara

## 2017-02-03 NOTE — PLAN OF CARE
CARDIOVASCULAR - ADULT    • Maintains optimal cardiac output and hemodynamic stability Progressing    Pt VSS.      DRUG ABUSE/DETOX    • Will have no detox symptoms and will verbalize plan for changing drug-related behavior Progressing    Pt unable to respo

## 2017-02-03 NOTE — CONSULTS
[facility]    PATIENT'S NAME: Nevaeh Current   ATTENDING PHYSICIAN: Octavio Swartz MD   CONSULTING PHYSICIAN: Jennifer Padgett MD   PATIENT ACCOUNT#:   [id number]    LOCATION:  [address] [place] [address] 5657 #:   [id number]       DATE OF BIRTH:  [birth year] Supple. LUNGS:  Decreased breath sounds in both bases. HEART:  Regular rhythm and rate. There is a 2/6 systolic murmur. ABDOMEN:  Soft. EXTREMITIES:  No edema. SKIN:  He is jaundiced. NEUROLOGIC:  The patient is intubated and sedated.   PSYCH:  Patie months if patient abstains from alcohol, as alcoholic cardiomyopathy is certainly a consideration. 5.   Follow renal function as well as platelet counts. 6.   Discussed with Dr. Parker Albert.   7.      Evaluation for his cardiomyopathy in the future, after milo

## 2017-02-03 NOTE — SEPSIS REASSESSMENT
[facility]    Sepsis Reassessment Note    /80 mmHg  Pulse 86  Temp(Src) 97.8 °F (36.6 °C) (Temporal)  Resp 17  Ht 5' 11\" (1.803 m)  Wt 149 lb 9.6 oz (67.858 kg)  BMI 20.87 kg/m2  SpO2 88%     10:41 PM    Cardiac:  Regularity: Regul

## 2017-02-03 NOTE — PLAN OF CARE
Identified pt is lethargic and unable to give lactulose at this time. Identified that patient may need feeding tube.  Per DR Sara Kang no feeding tube at this time

## 2017-02-03 NOTE — PLAN OF CARE
Change in neuro status. Unresponsive pt. Assessed blood glucose and was recorded. Not Hypoglycemic. Patient has not had any sedatives since am. Vitals recorded. DR Michelle notified.  Stat chest xray was done, Called Stroke ALERT AND notified ex wife of ev

## 2017-02-03 NOTE — ED PROVIDER NOTES
PROCEDURE Note:     Called to CCU for intubation. Rapid sequence intubation:   Indication for the procedure was airway protection as pt did not have gag reflex. The patient was preoxygenated with 100% oxygen by face mask.   The patient was given the fol

## 2017-02-03 NOTE — SIGNIFICANT EVENT
Night MD - CCU    Impressions:  ---Hepatic encephalopathy with recurrent decreased responsiveness  ---Minimal gag reflex  ---Metabolic/lactic acidosis, sepsis protocol previously initiated -> vancomycin given and Zosyn ordered  ---Systolic heart failure, w somewhat less responsive, though moving all extremities. Clenched mouth tightly, but cooperated when asked to open mouth. When tongue blade was inserted -> only very minimal gag.   Nurse had already been ordered to have patient intubated before that, when

## 2017-02-03 NOTE — PLAN OF CARE
Called Dr. Dee Dupree (on call for Dr. Eufemia Sosa) with an update at change of shift 2000. After letting him know the pt's ABG results and that he was still unresponsive to everything but pain he gave orders to intubate patient to protect airway.  Also let the MD raza

## 2017-02-03 NOTE — PLAN OF CARE
Pts ABG results back, notified Dr. Sergey Chu and Dr. Curtis Mat, no new orders. Per Dr. Curtis Mat keep OG clamped.

## 2017-02-03 NOTE — PLAN OF CARE
CARDIOVASCULAR - ADULT    • Maintains optimal cardiac output and hemodynamic stability Not Progressing    rUNS SINUS PAMELA    DRUG ABUSE/DETOX    • Will have no detox symptoms and will verbalize plan for changing drug-related behavior PROGESSIGN    DID NOT

## 2017-02-03 NOTE — PROGRESS NOTES
[facility]  Gastroenterology Progress Note    Jarvis Garcia Patient Status:  Inpatient    1940 MRN [id number]   Location [facility] 2W/ Attending Kian Padilla MD   [facility] Day # 4 PCP None Pcp     Subjective:  Jarvis Garcia is a(n) 77 y atelectasis with or without coexistent pneumonia. Ct Stroke Brain (no Iv)(cpt=70450)    2/2/2017  CONCLUSION:  1. No evidence of acute intracranial hemorrhage. No acute intracranial process by noncontrast CT technique.   2. Senescent changes of paren

## 2017-02-03 NOTE — PROGRESS NOTES
MD ordered ABG for pt. Called and had all members of the respiratory team try but could not get specimen. Next shift team is going to try, if still no results MD is to be called. Sats 100, per Dr. Vandana Olivera turn Fi02 down to 60% at 2300, pending ABG.

## 2017-02-03 NOTE — PROGRESS NOTES
[facility]    Progress Note    Deana Dimas Patient Status:  Inpatient    1940 MRN [id number]   Location [facility] 2W/SW Attending Roque Mcbride MD   [facility] Day # 4 PCP None Pcp       Subjective:   Deana Dimas is a(n) 77 year mg/250 ml premix infusion 0.5-30 mcg/min Intravenous Continuous PRN   vasopressin (PITRESSIN) 20 Units in sodium chloride 0.9 % 50 mL infusion for shock 0.04 Units/min Intravenous Continuous PRN   Piperacillin Sod-Tazobactam So (ZOSYN) 3.375 g in dextrose 02/02/2017   TSH 3.80 02/03/2017   MG 2.2 01/31/2017   B12 >1500* 02/03/2017       Xr Chest Ap Portable  (cpt=71010)    2/2/2017  CONCLUSION:  1. Interval placement of ET tube with tip approximately 1.5 cm from the rahul.  Recommend retraction approximatel

## 2017-02-03 NOTE — PROGRESS NOTES
[facility] Hospitalist Progress Note     PCP: None Pcp    CC: Follow up, ETOH hepatitis, cirrhosis       Assessment/Plan:     Mr Claire Radford is a 77year old male long time smoker, daily beer drinker, no known medical problems, has not seen a doctor in many decades a PPI, hgb 13.4->12.4->cont to monitor  -GI updated, NPO for now, pending speech eval since more awake  -prn antiemetics    ETOH dependence/withdrawal, ETOH hepatitis acute, chronic ETOH cirrhosis  -Hepatitis panel neg  -CIWA-->transition to precedex if need 0600   Gross per 24 hour   Intake   2380 ml   Output    676 ml   Net   1704 ml       Last 3 Weights  02/03/17 0500 : 155 lb 14.4 oz (70.716 kg)  02/02/17 0400 : 149 lb 9.6 oz (67.858 kg)  01/31/17 0510 : 139 lb 6.4 oz (63.231 kg)  01/30/17 1327 : 132 lb 12 [DISCONTINUED] LORazepam, Metoclopramide HCl    Data Review:       Labs:     Recent Labs   Lab  02/01/17   0615  02/01/17   1304  02/02/17   0538  02/02/17   1605  02/02/17   1613  02/03/17   0519   WBC  5.8   --   7.7   --   8.1  8.8   HGB  13.7  13.6  12

## 2017-02-04 NOTE — PROGRESS NOTES
[facility] Hospitalist Progress Note     PCP: None Pcp    CC: Follow up, ETOH hepatitis, cirrhosis       Assessment/Plan:     Mr Vasile Ledezma is a 77year old male long time smoker, daily beer drinker, no known medical problems, has not seen a doctor in many decades a sepsis  -LFTs are improving  -cont lacutulose  -AFP normal, will need follow up for appropriate outpt care  -GI following  -SW for assistance-->anticipate [facility]  -needs to stop drinking, stressed with family, pt in denial, only reports 1 drink a day, confirm 139 lb 6.4 oz (63.231 kg)  01/30/17 1327 : 132 lb 12.8 oz (60.238 kg)  01/30/17 0951 : 120 lb (54.432 kg)      Exam  Gen: intubated, not following commands  Neck  Supple, no JVD  Pulm: BS improved b/l, still course  CV: Heart with regular rate and rhythm, LORazepam, Metoclopramide HCl    Data Review:       Labs:     Recent Labs   Lab  02/01/17   1304  02/02/17   0538  02/02/17   1605  02/02/17   1613  02/03/17   0519  02/04/17   0615   WBC   --   7.7   --   8.1  8.8  8.1   HGB  13.6  12.4*   --   12.7*  12.

## 2017-02-04 NOTE — PLAN OF CARE
CARDIOVASCULAR - ADULT    • Maintains optimal cardiac output and hemodynamic stability Not Progressing        METABOLIC/FLUID AND ELECTROLYTES - ADULT    • Electrolytes maintained within normal limits Not Progressing          CARDIOVASCULAR - ADULT    • Ab

## 2017-02-04 NOTE — PROGRESS NOTES
PICC Line Insertion Note    Procedure:  Insertion of # 5 FR / Double Power Solo    Indications:  Phlebitis with infusion    Procedure Details   Patient and/or significant others educated regarding insertion of PICC line, rationale for procedure, and after c

## 2017-02-04 NOTE — PLAN OF CARE
DRUG ABUSE/DETOX    • Will have no detox symptoms and will verbalize plan for changing drug-related behavior Not Progressing    Pt currently orally intubated and dependent on the vent.     METABOLIC/FLUID AND ELECTROLYTES - ADULT    • Electrolytes maintaine

## 2017-02-04 NOTE — PROGRESS NOTES
[facility]  Cardiology Progress Note    Uriel Clutter Patient Status:  Inpatient    1940 MRN [id number]   Location [facility] 2W/SW Attending Pritesh Lund MD   Hosp Day # 5 PCP None Pcp     Assessment:  Systolic CHF-likely alcoholic card hours) at 02/04/17 1207  Last data filed at 02/04/17 0600   Gross per 24 hour   Intake   3967 ml   Output   1475 ml   Net   2492 ml     Last 3 Weights  02/04/17 0600 : 163 lb 8 oz (74.163 kg)  02/03/17 0500 : 155 lb 14.4 oz (70.716 kg)  02/02/17 0400 : 149 Labs   02/02/17  1613 02/03/17  0519 02/04/17  0615   WBC 8.1 8.8 8.1   HGB 12.7* 12.4* 11.8*   PLT 26* 27* 110*       Recent Labs   02/02/17  0538 02/03/17  0519 02/04/17  0615   BUN 18 20 20   CREATSERUM 1.85* 2.03* 1.64*   MG  --  1.9 1.7*   * 136

## 2017-02-04 NOTE — PROGRESS NOTES
GI  PROGRESS NOTE    SUBJECTIVE: remains intubated; staff report pt had BM (no brbpr/melena). Warming blanket.        OBJECTIVE:  Temp:  [96 °F (35.6 °C)-96.7 °F (35.9 °C)] 96.5 °F (35.8 °C)  Pulse:  [66-95] 80  Resp:  [13-18] 16  BP: ()/(48-80) 99/ lactated ringers 100 mL/hr at 02/04/17 5967   • norepinephrine 1 mcg/min (02/03/17 2200)   • norepinephrine     • vasopressin (PITRESSIN) infusion for shock       Normal Saline Flush, norepinephrine, vasopressin (PITRESSIN) infusion for shock, polyethylene

## 2017-02-04 NOTE — RESPIRATORY THERAPY NOTE
Pt found on CPAP 5, ps of 5, fio2 40%. Not in resp distress, vt 410, spo2 100%. Hypotensive, placed back on full support. RN at bed side.

## 2017-02-04 NOTE — PROGRESS NOTES
Dr. Pino Ohs in the unit and updated him of pt's condition, stated that he did want the pt to receive 2 units of platelets yesterday to have a PICC line inserted on the pt today.

## 2017-02-04 NOTE — PROGRESS NOTES
[facility] dosing service    Follow-up Pharmacokinetic Consult for Vancomycin Dosing     Maeve Jordan is a 77year old male who is being treated for ASPIRATION PNA/SEPSIS. Patient is on day 4 of Vancomycin 1 gm IV Q 24 hours.   Goal trough is 15-20 ug/mL Pharmacy REORDERED Vancomycin trough levels prior to 3RD dose AT 1330 ON 2/7. Goal trough level 15-20 ug/mL. 3.  Pharmacy will need BUN/Scr daily while on Vancomycin to assess renal function.     4.  Pharmacy will follow and monitor renal function campoverde

## 2017-02-04 NOTE — PROCEDURES
[facility], [address] [address]      PATIENT'S NAME: Nancy Susie   ATTENDING PHYSICIAN: Octavio Harrison MD   PATIENT ACCOUNT #: [de-identified] LOCATION: 1964 [facility] #: [id number] DATE OF BIRTH: [birth year]

## 2017-02-04 NOTE — PROGRESS NOTES
Robert Reynoso Patient Status:  Inpatient    1940 MRN [id number]   Location [facility] 2W/SW Attending Carlos Bright MD   [facility] Day # 5 PCP None Pcp     Critical Care Progress Note    Assessment/Plan:  1.  Respiratory failure - intubated for ai Care Time greater than: 35 minutes    Mercy Aguilar M.D. Pulmonary and Critical Care Medicine  [facility]     Subjective: Intermittent pressor requirement overnight.      Objective:    Medications:  • dexmedetomidine (PRECEDEX) bolus from b 43*  39*   GFRNAA  36*  32*   CA  8.4*  8.5   ALB  2.6*  2.7*   NA  131*  136   K  3.9  3.6   CL  103  107   CO2  17*  18*   ALKPHO  70  57   AST  410*  297*   ALT  268*  236*   BILT  5.4*  5.7*   TP  6.2  6.4     Recent Labs   Lab  02/02/17 2055   [facility]

## 2017-02-04 NOTE — PROGRESS NOTES
Received report that pt has order to transfuse 2 units of platelets but from the order list, pt only has an order for 1 unit of platelets. Spoke to Dr. Kayla Douglas, updated him of pt's latest plt ct and received order to transfuse only 1 unit of platelets.

## 2017-02-04 NOTE — DIETARY NOTE
NUTRITION UPDATE:  Order received to begin Enteral Feeding via OG- pt on Vent. Tube feeding ordered as Promote to begin @ 25 ml/hr and advance slowly 10 ml every 8 hour per pt tolerance to goal rate of 60 ml/hr.  At goal rate TF will provide: 1320 Kcal, 83

## 2017-02-04 NOTE — PROGRESS NOTES
He attempts to open eyes to verbal stimuli. Does move arms and legs. No nuchal rigidity. I reviewed the carotid ultrasound report suggesting a left internal carotid artery occlusion, CT of the head, ammonia level, TSH, B12 level, report of EEG.     Imp

## 2017-02-05 NOTE — RESPIRATORY THERAPY NOTE
RT mechanical ventilation progress note    Mechanical ventilation: Patient does not meet criteria for weaning on this shift. Weaning not attempted.      Set Vt 550 ml   Current Ppeak: 25 cmH2O  Current shift Pplateau: 14 [id number]    Current shift AutoPEEP: David Keep

## 2017-02-05 NOTE — PROGRESS NOTES
GI  PROGRESS NOTE    SUBJECTIVE: no significant events overnight. dariana TF. Son at bedside.        OBJECTIVE:  Temp:  [95.5 °F (35.3 °C)-98.1 °F (36.7 °C)] 98.1 °F (36.7 °C)  Pulse:  [] 96  Resp:  [15-22] 16  BP: ()/(53-80) 108/69 mmHg  FiO2 (%) (PITRESSIN) infusion for shock       dextrose, Glucose-Vitamin C, balsam peru-castor oil, Normal Saline Flush, dextrose, Normal Saline Flush, vasopressin (PITRESSIN) infusion for shock, polyethylene glycol, ipratropium-albuterol, morphINE sulfate **OR** [D

## 2017-02-05 NOTE — PROGRESS NOTES
Off sedation is much more alert, cooperative with exam today. He  well with both hands, able to raise arms off off the bed, moves his legs to command, opens eyes, shakes his head no when asked about headache. Tracks well with visual trigger.   Visual

## 2017-02-05 NOTE — PROGRESS NOTES
[facility] Hospitalist Progress Note     PCP: None Pcp    CC: Follow up, ETOH hepatitis, cirrhosis       Assessment/Plan:     Mr Derek Biggs is a 77year old male long time smoker, daily beer drinker, no known medical problems, has not seen a doctor in many decades a drinking, stressed with family, pt in denial, only reports 1 drink a day, confirmed w family and friend that is not the case    Acute Sys CHF   - ECHO with EF of 25-30%, elevated RA pressures  - no prior CA hs, could be related to ETOH abuse, vs underlying (70.716 kg)  02/02/17 0400 : 149 lb 9.6 oz (67.858 kg)  01/31/17 0510 : 139 lb 6.4 oz (63.231 kg)  01/30/17 1327 : 132 lb 12.8 oz (60.238 kg)  01/30/17 0951 : 120 lb (54.432 kg)      Exam  Gen: intubated, not following commands  Neck  Supple, no JVD  Pulm: 02/02/17   1613  02/03/17   0519  02/04/17   0615  02/05/17   0510   WBC   --   8.1  8.8  8.1  10.3   HGB   --   12.7*  12.4*  11.8*  12.3*   MCV   --   98.5  98.4  98.3  98.4   PLT   --   26*  27*  110*  97*   INR  1.6*   --    --   1.5*  1.5*       Rece

## 2017-02-05 NOTE — PLAN OF CARE
Problem: Diabetes/Glucose Control  Goal: Glucose maintained within prescribed range  INTERVENTIONS:  - Monitor Blood Glucose as ordered  - Assess for signs and symptoms of hyperglycemia and hypoglycemia  - Administer ordered medications to maintain glucose coronary artery perfusion - ex. Angina  - Evaluate fluid balance, assess for edema, trend weights   Outcome: Not Progressing  Patient becomes tachycardic with HR in the 120s-130s with stimulation. He becomes agitated on the ventilator.  He also remains on restriction as ordered  - Instruct patient on fluid and nutrition restrictions as appropriate   Outcome: Progressing  Will continue to monitor.      Problem: SKIN/TISSUE INTEGRITY - ADULT  Goal: Skin integrity remains intact  INTERVENTIONS  - Assess and doc ordered parameters to optimize cerebral perfusion and minimize risk of hemorrhage  - Monitor temperature, glucose, and sodium.  Initiate appropriate interventions as ordered   Outcome: Not Progressing  Patient only able to follow small commands at this time patient’s physical comfort, circulation, skin condition, hydration, nutrition and elimination needs   - Reorient and redirection as needed  - Assess for the need to continue restraints   Outcome: Not Progressing  Patient remains in restraints at this time.

## 2017-02-05 NOTE — PROGRESS NOTES
[facility]  Cardiology Progress Note    Sally Warner Patient Status:  Inpatient    1940 MRN [id number]   Location [facility] 2W/SW Attending Abdoulaye Abbott MD   Hosp Day # 6 PCP None Pcp     Assessment:  Systolic CHF-likely alcoholic card lb 11.2 oz (75.615 kg)  02/04/17 0600 : 163 lb 8 oz (74.163 kg)  02/03/17 0500 : 155 lb 14.4 oz (70.716 kg)  02/02/17 0400 : 149 lb 9.6 oz (67.858 kg)  01/31/17 0510 : 139 lb 6.4 oz (63.231 kg)  01/30/17 1327 : 132 lb 12.8 oz (60.238 kg)  01/30/17 0951 : 1 236* 152* 132*   * 161* 122*   ALB 2.7* 2.3* 2.3*         Lab Results  Component Value Date   INR 1.5* 02/05/2017   INR 1.5* 02/04/2017   INR 1.6* 02/02/2017         Telemetry:  NSR/sinus tachy      Chris Beach MD    2/5/2017  1:12 PM

## 2017-02-05 NOTE — PROGRESS NOTES
Jose Alfredo Sweeney Patient Status:  Inpatient    1940 MRN [id number]   Location [facility] 2W/SW Attending Shaylee Clark MD   [facility] Day # 6 PCP None Pcp     Critical Care Progress Note      Assessment/Plan:  1.  Respiratory failure - intubated for decompensation      Critical Care Time greater than: 35 minutes    Keegan Guardado M.D. Pulmonary and Critical Care Medicine  [facility]     Subjective: Tachycardic overnight.  Responds to touch, follows minimal commands    Objective:    Medi 98.4   MCH  33.6*   MCHC  34.2   RDW  19.5*   WBC  10.3   PLT  97*     Recent Labs   Lab  02/03/17   0519  02/04/17   0615  02/05/17   0510   GLU  164*  152*  187*   BUN  20  20  26*   CREATSERUM  2.03*  1.64*  2.03*   GFRAA  39*  50*  39*   GFRNAA  32*  4

## 2017-02-05 NOTE — PLAN OF CARE
NEUROLOGICAL - ADULT    • Achieves stable or improved neurological status Progressing        SKIN/TISSUE INTEGRITY - ADULT    • Skin integrity remains intact Progressing        Safety Risk - Non-Violent Restraints    • Patient will remain free from self-ha

## 2017-02-06 NOTE — PLAN OF CARE
Problem: Patient Centered Care  Goal: Patient preferences are identified and integrated in the patient’s plan of care  Interventions:  - What would you like us to know as we care for you?  - Provide timely, complete, and accurate information to patient/fam Monitor for bleeding, hypotension and signs of decreased cardiac output  - Evaluate effectiveness of vasoactive medications to optimize hemodynamic stability  - Monitor arterial and/or venous puncture sites for bleeding and/or hematoma  - Assess quality of response to electrolyte replacements, including rhythm and repeat lab results as appropriate  - Fluid restriction as ordered  - Instruct patient on fluid and nutrition restrictions as appropriate   Outcome: Progressing  Potassium replaced today    Problem: fluid volume within ordered parameters to optimize cerebral perfusion and minimize risk of hemorrhage  - Monitor temperature, glucose, and sodium.  Initiate appropriate interventions as ordered   Outcome: Progressing  Pt drowsy, but awakes to have conversat

## 2017-02-06 NOTE — PROGRESS NOTES
[facility] Hospitalist Progress Note     PCP: None Pcp    CC: Follow up, ETOH hepatitis, cirrhosis       Assessment/Plan:     Mr Clif Akhtar is a 77year old male long time smoker, daily beer drinker, no known medical problems, has not seen a doctor in many decades a when following commands, cont to monitor closely  -repeat CT if persistent  - now sedated intermittently with precedex    Acute Resp Failure   - became altered again 8pm on 2/2/17, unable to protect airway, no gag  - Patient intubated, on vent setting per 517 ml       Last 3 Weights  02/06/17 0200 : 167 lb (75.751 kg)  02/05/17 0500 : 166 lb 11.2 oz (75.615 kg)  02/04/17 0600 : 163 lb 8 oz (74.163 kg)  02/03/17 0500 : 155 lb 14.4 oz (70.716 kg)  02/02/17 0400 : 149 lb 9.6 oz (67.858 kg)  01/31/17 0510 : **OR** [DISCONTINUED] LORazepam **OR** [DISCONTINUED] LORazepam, Metoclopramide HCl    Data Review:       Labs:     Recent Labs   Lab  02/04/17   0615  02/05/17   0510  02/06/17   0457   WBC  8.1  10.3  12.4*   HGB  11.8*  12.3*  12.5*   MCV  98.3  98.4  9

## 2017-02-06 NOTE — RESPIRATORY THERAPY NOTE
Received patient on following settings  A/C 12  Vt 550  FiO2 .4  Peep +5    @ 1000 Placed on \"T\" Piece trial, FiO2 .4 for approximately 50 minutes. Vitals stable, patient responds to command.     Extubated @ 1100 and placed on cannula @ 4lpm

## 2017-02-06 NOTE — DISCHARGE PLANNING
Pt was extubated today, 2/6.    SW contacted pt's wife/Laura; wife stated she has not toured any facilities yet, but will be doing so this week. Laura stated she would contact SW after she tours facilities.     Inés Osullivan, [address]

## 2017-02-06 NOTE — PROGRESS NOTES
He is awake, cooperative, alert, able to tell me me his name, that he is in hospital, year, month. Denies headache. Cranial nerves III through VII 9 through 12 normal.  Visual fields full.   Strength in arms and legs are normal.    Impression encephalop

## 2017-02-06 NOTE — PLAN OF CARE
Problem: Diabetes/Glucose Control  Goal: Glucose maintained within prescribed range  INTERVENTIONS:  - Monitor Blood Glucose as ordered  - Assess for signs and symptoms of hyperglycemia and hypoglycemia  - Administer ordered medications to maintain glucose ex. Angina  - Evaluate fluid balance, assess for edema, trend weights   Outcome: Not Progressing  Patient remains on levophed drip at this time for hypotension.     Goal: Absence of cardiac arrhythmias or at baseline  INTERVENTIONS:  - Continuous cardiac mo Progressing  Patient remains in fluid volume overload. Lasix was started yesterday. He is edamatous to his upper extremities and lower extremities, along with his scrotal/hillary area.      Problem: SKIN/TISSUE INTEGRITY - ADULT  Goal: Skin integrity remains status  - Initiate measures to prevent increased intracranial pressure  - Maintain blood pressure and fluid volume within ordered parameters to optimize cerebral perfusion and minimize risk of hemorrhage  - Monitor temperature, glucose, and sodium.  Initiat

## 2017-02-06 NOTE — PROGRESS NOTES
Critical Care Progress Note     Assessment / Plan:  1.  Respiratory failure - intubated for airway protection, mental status improved today  - continue mechanical ventilation  - minimize sedation  - spontaneous breathing trial today  - magalie funezn  - rick Resp: 13 12 13 12   Height:       Weight:       SpO2: 99% 99% 99% 99%     Vent: AC/VC  12 / 550 / 5 / 40    Physical Exam:  General - intubated  Respiratory - clear breath sounds bilaterally  Cardiovascular - RRR, no MRG  Abdo - soft, NTND  Musculoskelet

## 2017-02-06 NOTE — PROGRESS NOTES
[facility]  Progress Note    Nelson Hines Patient Status:  Inpatient    1940 MRN [id number]   Location [facility] 2W/SW Attending Charly Vegas MD   [address] Day # 7 PCP None Pcp     Impression:  IMPRESSION:    1.      Acute sedated, intubated       Current Facility-Administered Medications:  potassium chloride IVPB premix 40 mEq 40 mEq Intravenous Once   Normal Saline Flush 0.9 % injection      dextrose injection 50 mL 50 mL Intravenous PRN   Insulin Regular Human (NOVOLIN R) 17 g Oral Daily PRN   bisacodyl (DULCOLAX) rectal suppository 10 mg 10 mg Rectal Daily PRN   ondansetron HCl (ZOFRAN) injection 4 mg 4 mg Intravenous Q6H PRN   nicotine (NICODERM CQ) 7 MG/24HR 1 patch 1 patch Transdermal Daily   LORazepam (ATIVAN) tab 1 mg Nasogastric tube is reidentified. ECHO:  1. Left ventricle: The cavity size was normal. Wall thickness was normal.     Systolic function was severely reduced. The estimated ejection fraction was     25-30%. 2. Aortic valve:  There was at least mi

## 2017-02-06 NOTE — RESPIRATORY THERAPY NOTE
RT mechanical ventilation progress note    Mechanical ventilation: Patient does not  meet criteria for weaning on this shift. Weaning not  attempted in this shift.      Set Vt 550 ml/kg IBW  Current Ppeak: 19 cmH2O  Current shift Pplateau: 17 [id number]  Current

## 2017-02-07 NOTE — SLP NOTE
ADULT SWALLOWING EVALUATION    ASSESSMENT & PLAN   ASSESSMENT  Pt seen upright in bed. No family was present at bedside. Pt was alert, oriented and cooperative. Pt verbally expressed excitement to \"eat and drink\".       PLAN   Diet Recommendations - Solid Impaired  Laryngeal Elevation Timing: Impaired  Laryngeal Elevation Strength: Impaired  Laryngeal Elevation Coordination: Impaired  (Please note: Silent aspiration cannot be evaluated clinically.  Videofluoroscopic Swallow Study is required to rule-out sile

## 2017-02-07 NOTE — PROGRESS NOTES
[facility] Hospitalist Progress Note     PCP: None Pcp    CC: Follow up, ETOH hepatitis, cirrhosis       Assessment/Plan:     Mr Tash Aly is a 77year old male long time smoker, daily beer drinker, no known medical problems, has not seen a doctor in many decades a ok, CT head without bleeding  -EEG with encephalopathy, carotids with left ICA occulsion  -no focal deficits when following commands, AO*2 this morning  - PT/OT, continue lactulose    ETOH dependence/withdrawal, ETOH hepatitis acute, chronic ETOH cirrhosis kg)  02/05/17 0500 : 166 lb 11.2 oz (75.615 kg)  02/04/17 0600 : 163 lb 8 oz (74.163 kg)  02/03/17 0500 : 155 lb 14.4 oz (70.716 kg)  02/02/17 0400 : 149 lb 9.6 oz (67.858 kg)  01/31/17 0510 : 139 lb 6.4 oz (63.231 kg)  01/30/17 1327 : 132 lb 12.8 oz (60.2 3.6  3.8   CL  110  111*  111*   CO2  19*  20*  20*   BUN  26*  33*  37*   CREATSERUM  2.03*  1.97*  2.09*   CA  8.3*  8.4*  8.5   MG  1.9  2.0  2.0   PHOS  3.6   --    --    GLU  187*  220*  162*       Recent Labs   Lab  02/05/17   0510  02/06/17   0457

## 2017-02-07 NOTE — PHYSICAL THERAPY NOTE
PHYSICAL THERAPY EVALUATION - INPATIENT     Room Number: 205/205-A  Evaluation Date: 2/7/2017  Type of Evaluation: Initial  Physician Order: PT Eval and Treat    Presenting Problem: community acquired pneumonia and ETOH withdrawal  Reason for Therapy: extremity ROM is within functional limits     Lower extremity strength is within functional limits     BALANCE  Static Sitting: Fair     Dynamic Sitting: Fair -  Static Standing: Poor +  Dynamic Standing: Poor -       ADDITIONAL TESTS staff (RN present in room)    ASSESSMENT     Patient is a 77year old male admitted 1/30/2017 for pneumonia and ETOH withdrawal.   The pt was received alert in his bed and he was agreeable for therapy.  The pt was lethargic throughout his session but he was demonstrate supine - sit EOB @ level: minimum assistance     Goal #2 Patient is able to demonstrate transfers Sit to/from Stand at assistance level: minimum assistance     Goal #3 Patient is able to ambulate 50 feet with assist device: walker - rolling at

## 2017-02-07 NOTE — PROGRESS NOTES
Rosita Hem Patient Status:  Inpatient    1940 MRN [id number]   Location [facility] 2W/SW Attending Julio Murphy MD   [facility] Day # 8 PCP None Pcp     Critical Care Progress Note    Assessment/Plan:  1.  Respiratory failure - intubated for ai Insulin Regular Human  1-7 Units Subcutaneous Q6H   • Thiamine HCl  100 mg Per NG Tube Daily   • furosemide  40 mg Intravenous BID (Diuretic)   • magnesium oxide  400 mg Oral Once   • dexmedetomidine (PRECEDEX) bolus from bag  1 mcg/kg (Dosing Weight) Intr noted.

## 2017-02-07 NOTE — PLAN OF CARE
CARDIOVASCULAR - ADULT    • Maintains optimal cardiac output and hemodynamic stability Progressing    • Absence of cardiac arrhythmias or at baseline Progressing    Pt off levophed overnight. SBP: high 80's-low 100's, MAP: 60-70's, HR: 80's, SR on monitor.

## 2017-02-07 NOTE — PROGRESS NOTES
[facility]  Progress Note    Aashish Chan Patient Status:  Inpatient    1940 MRN [id number]   Location [facility] 2W/SW Attending Karel Nair MD   [address] Day # 8 PCP None Pcp     Impression:  IMPRESSION:    1.      Acute sedated, intubated       Current Facility-Administered Medications:  Normal Saline Flush 0.9 % injection      lactulose (CHRONULAC) 10 GM/15ML solution 20 g 20 g Oral Q6H PRN   MethylPREDNISolone Sodium Succ (Solu-MEDROL) injection 60 mg 60 mg Intravenous Results  Component Value Date    02/07/2017   K 3.8 02/07/2017    02/07/2017   CO2 20 02/07/2017   BUN 37 02/07/2017   CREATSERUM 2.09 02/07/2017    02/07/2017   CA 8.5 02/07/2017   MG 2.0 02/07/2017     No results for input(s): TROP, CK regurgitation. 7. Pulmonary arteries: PA peak pressure: 40mm Hg (S). 8. Pericardium, extracardiac: There was a left pleural effusion.

## 2017-02-08 NOTE — SLP NOTE
SPEECH DAILY NOTE - INPATIENT    Evaluation Date: 02/08/2017    ASSESSMENT & PLAN   ASSESSMENT  Pt seen sitting upright in bed for all PO trials. Pt's RN gave medications with thin liquid trials via open cup.  Pt verbalized difficulty with pill \"not going independently drink from open cup. GOAL PROGRESSING.    Goal #3 The patient will utilize compensatory strategies as outlined by  BSSE (clinical evaluation) including Small bites, Small sips, Alternate liquids/solids, No straws, Upright 90 degrees with m

## 2017-02-08 NOTE — PROGRESS NOTES
Pulmonary Progress Note     Assessment / Plan:  1. Respiratory failure - intubated for airway protection. Extubated 2/6  - wean O2 as able  - duonebs prn  - bronchial hygiene  2. Shock - hypovolemic, septic.  Distributive remains in the differential given l no clubbing, no edema  Skin - no rashes  Mental status - interactive, answering questions appropriately    Medications:  Reviewed in EMR    Lab Data:  Reviewed in EMR    Imaging:  No new chest imaging

## 2017-02-08 NOTE — PROGRESS NOTES
[facility]    GI Progress Note      Bruno Niño Patient Status:  Inpatient    1940 MRN [id number]   Location [facility] 2W/SW Attending Allyson Bailon MD   Hosp Day # 9 PCP None Pcp          SUBJECTIVE:     Prior notes reviewe

## 2017-02-08 NOTE — PLAN OF CARE
Problem: Patient Centered Care  Goal: Patient preferences are identified and integrated in the patient’s plan of care  Interventions:  - What would you like us to know as we care for you?  - Provide timely, complete, and accurate information to patient/fam signs, rhythm, and trends  - Monitor for bleeding, hypotension and signs of decreased cardiac output  - Evaluate effectiveness of vasoactive medications to optimize hemodynamic stability  - Monitor arterial and/or venous puncture sites for bleeding and/or ordered  - Monitor response to electrolyte replacements, including rhythm and repeat lab results as appropriate  - Fluid restriction as ordered  - Instruct patient on fluid and nutrition restrictions as appropriate   Outcome: Progressing  K+ replaced today

## 2017-02-08 NOTE — PLAN OF CARE
Noticed rash to both arm pits during mornig assessment, both arm pits  very moist and red.  Dr. Margarita Alonso notified, will check it

## 2017-02-08 NOTE — PROGRESS NOTES
[facility] Hospitalist Progress Note     PCP: None Pcp    CC: Follow up, ETOH hepatitis, cirrhosis       Assessment/Plan:     Mr Javier Dennis is a 77year old male long time smoker, daily beer drinker, no known medical problems, has not seen a doctor in many decades a level is ok, pCO2 on ABG ok, CT head without bleeding  -EEG with encephalopathy, carotids with left ICA occulsion  -no focal deficits when following commands, AO*3 this morning  - PT/OT, changed lactulose to PRN    ETOH dependence/withdrawal, ETOH hepatiti kg)  02/05/17 0500 : 166 lb 11.2 oz (75.615 kg)  02/04/17 0600 : 163 lb 8 oz (74.163 kg)  02/03/17 0500 : 155 lb 14.4 oz (70.716 kg)  02/02/17 0400 : 149 lb 9.6 oz (67.858 kg)  01/31/17 0510 : 139 lb 6.4 oz (63.231 kg)  01/30/17 1327 : 132 lb 12.8 oz (60.2 02/07/17   1715  02/07/17 2059  02/08/17   0616   PGLU  135*  118*  173*  166*  170*       No results for input(s): TROP in the last 72 hours.     Imaging:    CXR with overload

## 2017-02-08 NOTE — OCCUPATIONAL THERAPY NOTE
OCCUPATIONAL THERAPY EVALUATION - INPATIENT     Room Number: 205/205-A  Evaluation Date: 2/8/2017  Type of Evaluation: Initial       Physician Order: IP Consult to Occupational Therapy  Reason for Therapy: ADL/IADL Dysfunction and Discharge Planning    ASS past surgical history.     HOME SITUATION  Type of Home: Condo  Home Layout: One level (elevator)  Lives With: Alone (Pt does have family support)                      Drives: Yes  Patient Regularly Uses: None    Stairs in Home: elevator bldg  Use of Assist CK    FUNCTIONAL TRANSFER ASSESSMENT  Supine to Sit : Maximum assistance (of 2)  Sit to Stand: Not tested    Toilet Transfer: not tested  Shower Transfer: NA  Chair Transfer: not tested    Bedroom Mobility: not tested      FUNCTIONAL ADL ASSESSMENT  [place]

## 2017-02-08 NOTE — PROGRESS NOTES
[facility]  Progress Note    Mehdi Daniels Patient Status:  Inpatient    1940 MRN [id number]   Location [facility] 2W/ Attending Sharonda Zhao MD   Hosp Day # 9 PCP None Pcp     Impression:  IMPRESSION:    1.      Acute intubated       Current Facility-Administered Medications:  lactulose (CHRONULAC) 10 GM/15ML solution 20 g 20 g Oral Q6H PRN   dextrose injection 50 mL 50 mL Intravenous PRN   Insulin Regular Human (NOVOLIN R) 100 UNIT/ML injection 1-7 Units 1-7 Units Subc MEDIAST/FRED:   The aorta is elongated and tortuous with atherosclerotic plaques. No visible mass or adenopathy.   LUNGS/PLEURA: Moderate to severe CHF noted with extensive consolidation/atelectasis at the lung bases and blunting of the costophrenic angle

## 2017-02-09 NOTE — OCCUPATIONAL THERAPY NOTE
OCCUPATIONAL THERAPY TREATMENT NOTE - INPATIENT     Room Number: 205/205-A    Presenting Problem: encephalopathy;pneumonia    Problem List  Principal Problem:    Community acquired pneumonia  Active Problems:    Hypoglycemia    Hyponatremia    Hypokalemia and dressing. Pt requires min assist to set up for eating. Pt required vc and mod assist to maintain unsupported sitting due to r lateral and posterior lean.  Arom exercises demonstrated and encouraged for edema management along w/ elevation bilateral hands

## 2017-02-09 NOTE — PROGRESS NOTES
[facility]  Progress Note    Nelson Hines Patient Status:  Inpatient    1940 MRN [id number]   Location [facility] 2W/ Attending Charly Vegas MD   Hosp Day # 10 PCP None Pcp     Impression:  IMPRESSION:    1.      Acute legs  Neuro: awake  Psych: awake       Current Facility-Administered Medications:  dextrose injection 50 mL 50 mL Intravenous PRN   insulin aspart (NOVOLOG) 100 UNIT/ML flexpen 1-5 Units 1-5 Units Subcutaneous TID CC   Pantoprazole Sodium (PROTONIX) EC tab mildly enlarged. MEDIAST/FRED:   The aorta is elongated and tortuous with atherosclerotic plaques. No visible mass or adenopathy.   LUNGS/PLEURA: Moderate to severe CHF noted with extensive consolidation/atelectasis at the lung bases and blunting of the c

## 2017-02-09 NOTE — SLP NOTE
SPEECH DAILY NOTE - INPATIENT    Evaluation Date: 02/07/2017    ASSESSMENT & PLAN   ASSESSMENT   Pt seen in the upright position in bed. No family present at bedside. Pt alert, oriented and cooperative.  Pt seen with vanilla milkshake carton with straw on t NOT MET.     Goal #3 The patient will utilize compensatory strategies as outlined by  BSSE (clinical evaluation) including Small bites, Small sips, Alternate liquids/solids, No straws, Upright 90 degrees with minimal assistance 90 % of the time across 3 ses

## 2017-02-09 NOTE — DIETARY NOTE
ADULT NUTRITION RE-ASSESSMENT    Pt is at moderate nutrition risk based on prolonged poor po and reported wt loss PTA. Off Tube feeds. Pt meets malnutrition criteria.       CRITERIA FOR MALNUTRITION DIAGNOSIS:  Criteria for non-severe malnutrition diagnos ( on Lactulose). PERTINENT PAST MEDICAL HISTORY: none. Pt has not seen MD in many decades. ANTHROPOMETRICS:  HT: 180.3 cm (5' 11\")    WT: 67.858 kg (149 lb 9.6 oz); 139# ( 1/31), 173# (2/9). Noted net I/O since admit +14L.  Wt increase thus likely PRESCRIPTION:  Diet: Mech soft nectar thick liquid  Oral Nutrition Supplements (ONS) Sugar Free shakes TID   Estimated Nutrition needs:   Calories: 2250-3476 calories/day (30-33 calories per kg)  Protein: 82-95 grams protein/day (1.3-1.5 grams protein per

## 2017-02-09 NOTE — CM/SW NOTE
Spoke with patient's x-wife  Marquise Kothari  21  /  [phone] to explain and enroll patient in the Medicare / BPCI program under  (sepsis). She agreed to phone f/u post d/c.   BPCI letter, brochure, [facility] free medication delivery radha

## 2017-02-09 NOTE — PROGRESS NOTES
[facility] Hospitalist Progress Note     PCP: None Pcp-->need referral, pt may request to follow up at [address] [place] [address],[address]: Follow up, ETOH hepatitis, cirrhosis, aspiration pneumonia, acute systolic heart failure       Assessment/Plan:     Mr Clif Akhtar is a 77year old mal aspiration pneumonia  -likely metabolic due to infection, etoh withdrawal, hepatic failure  -code stroke called 2/3, CT ok, was moving all 4 ext thereafter, Neurology consulted, no evidence of stroke  -ammonia level is ok, pCO2 on ABG ok, CT head without b 98 °F (36.7 °C)  Pulse:  [103-134] 105  Resp:  [17-22] 20  BP: ()/() 106/90 mmHg    Intake/Output:    Intake/Output Summary (Last 24 hours) at 02/09/17 1449  Last data filed at 02/09/17 1400   Gross per 24 hour   Intake    450 ml   Output    90 12.9*   MCV   --   98.0  99.6  99.8   PLT   --   57*  54*  44*   INR  1.5*   --   1.4*   --        Recent Labs   Lab  02/07/17   0551  02/08/17   0536  02/09/17   0603   NA  139  136  134*   K  3.8  4.3  4.3  4.1   CL  111*  107  105   CO2  20*  19*  20*

## 2017-02-09 NOTE — PHYSICAL THERAPY NOTE
PHYSICAL THERAPY TREATMENT NOTE - INPATIENT    Room Number: 205/205-A       Presenting Problem: community acquired pneumonia and ETOH withdrawal    Problem List  Principal Problem:    Community acquired pneumonia  Active Problems:    Hypoglycemia    Hypon to sitting on the side of the bed?: A Lot   How much help from another person does the patient currently need. ..   -   Moving to and from a bed to a chair (including a wheelchair)?: A Lot   -   Need to walk in hospital room?: Total   -   Climbing 3-5 steps

## 2017-02-09 NOTE — PROGRESS NOTES
Pulmonary Progress Note        NAME: Edith Bennett - ROOM: -A - MRN: [id number] - Age: 77year old - : 1940    Assessment/Plan:  1. Respiratory failure - intubated for airway protection.  Extubated 2/6  - wean O2 as able, likely to room air 02/09/17 0855  Last data filed at 02/09/17 0600   Gross per 24 hour   Intake    880 ml   Output    575 ml   Net    305 ml       Physical Exam:  /119 mmHg  Pulse 106  Temp(Src) 96.8 °F (36 °C) (Temporal)  Resp 18  Ht 5' 10.98\" (1.803 m)  Wt 173 lb 14

## 2017-02-10 NOTE — PLAN OF CARE
SKIN/TISSUE INTEGRITY - ADULT    • Skin integrity remains intact Not Progressing          CARDIOVASCULAR - ADULT    • Maintains optimal cardiac output and hemodynamic stability Progressing    • Absence of cardiac arrhythmias or at baseline Progressing

## 2017-02-10 NOTE — WOUND PROGRESS NOTE
WOUND CARE NOTE      PLAN   Recommendations:  Dietary consult for recommendations for nutrition to optimize wound healing  Turn schedules  Specialty bed: 1st step overlay   Heels elevated using pillows, heel wedge or heel boots to offload heels  Use of l 02/09/2017   ALKPHO 57 02/09/2017   BILT 3.5* 02/09/2017   TP 6.0 02/09/2017   AST 43* 02/09/2017   ALT 76* 02/09/2017   MG 1.8 02/09/2017   PHOS 3.6 02/05/2017

## 2017-02-10 NOTE — SLP NOTE
SPEECH DAILY NOTE - INPATIENT    Evaluation Date: 02/07/2017    ASSESSMENT & PLAN   ASSESSMENT   Pt seen to monitor tolerance of PO diet. RN reported patient has been coughing with solid consistencies today, but not liquids.     Patient seen in the upright cue to take small sips. Cued patient to alternate consistencies and take small bites and sips. Goal progressing.       New goal: The patient will tolerate mechanical soft consistency and nectar thick liquids without overt signs or symptoms of aspiration

## 2017-02-10 NOTE — PROGRESS NOTES
[facility]  Progress Note    Jarvis Garcia Patient Status:  Inpatient    1940 MRN [id number]   Location [facility] 2W/SW Attending Kian Padilla MD   Hosp Day # 11 PCP None Pcp     Impression:  IMPRESSION:    1.      Acute Facility-Administered Medications:  furosemide (LASIX) injection 20 mg 20 mg Intravenous Once   metoprolol Tartrate (LOPRESSOR) tab 12.5 mg 12.5 mg Oral 2x Daily(Beta Blocker)   dextrose injection 50 mL 50 mL Intravenous PRN   insulin aspart (NOVOLOG) 100 XR CHEST AP PORTABLE (CPT=71010)  FINDINGS:   CARDIAC/VASC: The heart is mildly enlarged. MEDIAST/FRED:   The aorta is elongated and tortuous with atherosclerotic plaques. No visible mass or adenopathy.   LUNGS/PLEURA: Moderate to severe CHF noted with ex

## 2017-02-10 NOTE — PROGRESS NOTES
[facility] Hospitalist Progress Note     PCP: None Pcp-->need referral, pt may request to follow up at [address] [place] [address],[address]: Follow up, ETOH hepatitis, cirrhosis, aspiration pneumonia, acute systolic heart failure       Assessment/Plan:     Mr Cherri Hatchet is a 77year old mal cirrhosis and CHF and IVF  -ECHO with CHF  - steroids stopped  -BD protocol    Encephalopathy / secondary to acute ETOH withdrawal and/or seizure, or metabolic encephalopathy due to aspiration pneumonia  -likely metabolic due to infection, etoh withdrawal, peak in the 50's post transfusion.    INR 1.4 elevated due to liver disease, did get Vit K this course, repeat INR in AM  Atrophy Prophylaxis PT/OT, up in chair, speech tx,  Lines/Monitors: PIV, picc since 2/4    Dispo: pending clinical course,  Can transfe Pulses palpated  Psych: awake alert, calm, cooperative  Neuro: Alert, oriented to person, place, year/month, not exact date, moving all ext    Medications     • furosemide  20 mg Intravenous Once   • metoprolol tartrate  12.5 mg Oral 2x Daily(Beta Blocker)

## 2017-02-11 NOTE — DISCHARGE PLANNING
SW received phone call from RN stated that pt's wife is agreeable w/ SNF for pt now. SW provided SNF list to pt's wife. DON ordered 2/2.     Ti Beasley, [address]

## 2017-02-11 NOTE — PROGRESS NOTES
[facility]    Progress Note    Lisa Dewitt Patient Status:  Inpatient    1940 MRN [id number]   Location [facility] 3W/SW Attending Palmira Aragon MD   [facility] Day # 12 PCP None Pcp       Subjective:   Lisa Dewitt is a(n) 33 yea

## 2017-02-11 NOTE — PROGRESS NOTES
[facility]  Progress Note    Sri Crain Patient Status:  Inpatient    1940 MRN [id number]   Location [facility] 2W/ Attending Gely Menjivar MD   Hosp Day # 12 PCP None Pcp     Impression:  IMPRESSION:    1.      Acute mEq 40 mEq Oral Once   metoprolol Tartrate (LOPRESSOR) tab 12.5 mg 12.5 mg Oral 2x Daily(Beta Blocker)   dextrose injection 50 mL 50 mL Intravenous PRN   insulin aspart (NOVOLOG) 100 UNIT/ML flexpen 1-5 Units 1-5 Units Subcutaneous TID CC   Pantoprazole So adenopathy. LUNGS/PLEURA: Moderate to severe CHF noted with extensive consolidation/atelectasis at the lung bases and blunting of the costophrenic angles. BONES: No fracture or visible bony lesion. OTHER: Endotracheal tube in good position.  Nasogastric

## 2017-02-11 NOTE — PROGRESS NOTES
[facility] Hospitalist Progress Note     PCP: None Pcp-->need referral, pt may request to follow up at [address] [place] [address],[address]: Follow up, ETOH hepatitis, cirrhosis, aspiration pneumonia, acute systolic heart failure       Assessment/Plan:     Mr Pedro Prince is a 77year old mal passed  -CXR c/w overload likely 2/2 cirrhosis and CHF and IVF  -ECHO with CHF  - steroids stopped  -BD protocol  -getting prn IV pushes of lasix, down 10 lbs from 2/9, BP in 90's hold lasix today    Encephalopathy / secondary to acute ETOH withdrawal and/ per speech, now on nectar thick    Prophylaxis:   DVT with SCD,  Plts today in the 30's which is still improved from the 20;s on admission, peak in the 50's post transfusion.    INR 1.4 elevated due to liver disease, did get Vit K this course  Atrophy Proph slowly, arms look improved, b/l LE still edematous but improving  Skin: sacral redness,  dusky finger tips but overall improving  Psych: awake alert, calm, cooperative  Neuro: Alert, oriented to person, place, year/month, not exact date, moving all ext

## 2017-02-12 NOTE — PROGRESS NOTES
[facility] Hospitalist Progress Note     PCP: None Pcp-->need referral, pt may request to follow up at [address] [place] [address],[address]: Follow up, ETOH hepatitis, cirrhosis, aspiration pneumonia, acute systolic heart failure       Assessment/Plan:     Mr Author Moore is a 77year old mal 2.06->1.85->1.55->1.41->1.44  -stop IVF, lasix started 2/5 - stopped 2/6, now getting prn IV pushes of lasix, down 10 lbs from 2/9. Then held on 2/11 due to BP. Still continues to lose weight.   Started on daily lasix 20 po, cont to monitor cr and Bp clos admission    Thrombocytopenia  - plts in 20's on admission improved s/p transfusion, but trending down since but today improved to 44, cont to monitor  - likely from portal HTN  - s/p transfusion 2/3 for picc placement  - no active bleeding, no indication : 166 lb 11.2 oz (75.615 kg)  02/04/17 0600 : 163 lb 8 oz (74.163 kg)  02/03/17 0500 : 155 lb 14.4 oz (70.716 kg)  02/02/17 0400 : 149 lb 9.6 oz (67.858 kg)  01/31/17 0510 : 139 lb 6.4 oz (63.231 kg)  01/30/17 1327 : 132 lb 12.8 oz (60.238 kg)  01/30/17 09 --   107   CO2  20*  25   --    --   20*   BUN  38*  38*   --    --   39*   CREATSERUM  1.55*  1.41   --    --   1.44   CA  8.3*  8.4*   --    --   8.6   GLU  183*  160*  246*  167*  168*       Recent Labs   Lab  02/11/17   0545   ALT  54   AST  33   ALB

## 2017-02-12 NOTE — PROGRESS NOTES
[facility]  Progress Note    Fields Cao Patient Status:  Inpatient    1940 MRN [id number]   Location [facility] 2W/ Attending Debby Chu MD   Hosp Day # 13 PCP None Pcp     Impression:  IMPRESSION:    1.      Acute Medications:  furosemide (LASIX) tab 20 mg 20 mg Oral Daily   Potassium Chloride ER (K-DUR M20) CR tab 40 mEq 40 mEq Oral Once   metoprolol Tartrate (LOPRESSOR) tab 12.5 mg 12.5 mg Oral 2x Daily(Beta Blocker)   dextrose injection 50 mL 50 mL Intravenous OR in the last 72 hours. CXR 2/4/17:  PROCEDURE: XR CHEST AP PORTABLE (CPT=71010)  FINDINGS:   CARDIAC/VASC: The heart is mildly enlarged. MEDIAST/FRED:   The aorta is elongated and tortuous with atherosclerotic plaques.  No visible mass or adenopathy

## 2017-02-12 NOTE — PLAN OF CARE
CARDIOVASCULAR - ADULT    • Maintains optimal cardiac output and hemodynamic stability Not Progressing        SKIN/TISSUE INTEGRITY - ADULT    • Skin integrity remains intact Not Progressing          CARDIOVASCULAR - ADULT    • Absence of cardiac arrhythmi

## 2017-02-13 NOTE — SIGNIFICANT EVENT
Code Blue    *See Code Blue Documentation Record*    Reason the code blue was called: HR down to 30's, unresponsive, large amount of purulent vomitus noted  Assessment of patient leading up to code: Unresponsive  Interventions/Testing: suctioned,cpr,intuba

## 2017-02-13 NOTE — RESPIRATORY THERAPY NOTE
Extubated pt at 1046am with RNs at the bedside. Followed verbal order from Dr Rob Hu to extubate the pt.

## 2017-02-13 NOTE — DISCHARGE SUMMARY
General Medicine Discharge Summary   Death Summary   Patient ID:  Amadou Blanco  77year old  12/16/1940    Admit date: 1/30/2017    Discharge date and time: 2/13/2017 11:06 AM (time of expiration)    Attending Physician: No att. providers found     Consul aspiration pneumonia, AMS, and ETOH withdrawal, requiring intubation,  Extubated 2/6.  Slowly improved, was progressing with improved renal function and liver function as well as cognitive function, then on 2/12 in the evening, vomited and believed to have infection, etoh withdrawal, hepatic failure  -code stroke called 2/3, CT ok, was moving all 4 ext thereafter, Neurology consulted, no evidence of stroke  -ammonia level is ok, pCO2 on ABG ok, CT head without bleeding  -EEG with encephalopathy, carotids wit

## 2017-02-13 NOTE — PROGRESS NOTES
Spoke with MD on call for the ICU. This patient was admitted back to ICU after code blue. It appears that the origin was respiratory - aspiration pneumonia. The patient earlier in the day had significant oral secretions.   The arrest was reported to be

## 2017-02-13 NOTE — TRANSITION NOTE
DR VANI NEWTON NOTIFIED OF PT CODE, RED BLOOD FROM NG, PT DNR., DROP IN H/H AFTER CODE,  PT SON DECISION TO NOT SCOPE PT AFTER MAKING HIM A DNR.   ORDER RECEIVED FOR PROTONIX

## 2017-02-13 NOTE — TRANSITION NOTE
PT RETURNED TO UNIT AT 2100 2/12/17 AFTER CODE;  UNRESPONSIVE TO PAIN; NO GAG WHEN SX; DELFINA PUPILS- EYES ROLLED UPWARD;  TITRATING PRESSORS, BICARB GTT FOR ACIDOSIS;  OG DRAINING LARGE AMT BLOOD;  NAILBEDS AND FINGERTIPS NAV[place] BLUE AND NON- BLANCHEABLE;  FAM

## 2017-02-13 NOTE — PROGRESS NOTES
Critical Care Progress Note     Assessment / Plan:  1. Acute hypoxemic respiratory failure  2. Septic shock  3. Aspiration PNA  4. Acute kidney injury  5. NSTEMI  6. Anemia    Wife and son were updated at bedside.  The critical nature of his situation was c

## 2017-02-13 NOTE — SIGNIFICANT EVENT
Code Blue - Night MD - CCU    Impressions:  ---Vomiting + aspiration -> respiratory arrest; prior aspiration + sepsis + hypotension earlier this admission, requiring ventilator support  ---Encephalopathy prior to arrest, had been improving; felt to be rela vomited & not breathing, pulseless -> Code Blue argueta -> CPR + suctioning -> Ambu bagging + CPR -> intubated by anesthesiology (-> good B air entry + colorimetric change & satisfactory ETT position on F/U CXR)    ---PEA initially (no pulses in spite of mul

## 2017-02-13 NOTE — PROGRESS NOTES
[facility]  Progress Note    Fieldsbonifacio Carroll Patient Status:  Inpatient    1940 MRN [id number]   Location [facility] 2W/SW Attending Debby Chu MD   Hosp Day # 14 PCP None Pcp     Impression:  IMPRESSION:    1.      Acute regular ENPKZW;1/4 systolic murmur  Lungs: coarse breath sounds  Abdomen: Soft, nondistended  Extremities:edema in UE; SCDs on LE  Neuro: sedated  Psych: sedated       Current Facility-Administered Medications:  furosemide (LASIX) tab 20 mg 20 mg Oral Buck infusion  Intravenous Continuous PRN   Normal Saline Flush 0.9 % injection 10 mL 10 mL Intravenous PRN   polyethylene glycol (GOLYTELY) solution 4,000 mL 4,000 mL Oral PRN   bisacodyl (DULCOLAX) EC tab 20 mg 20 mg Oral Once   ipratropium-albuterol ([place]) silhouette is prominent. There are prominent vascular markings. FRED/MEDIAST:   Obscured. LUNGS: Patchy multifocal air space opacities, most confluent at the right greater than left bases. PLEURA: There are bilateral pleural effusions. No pneumothorax.

## 2017-02-13 NOTE — PROGRESS NOTES
End of life care packet completed,  notified, spoke with family about end of life cares.  All doctors on healthcare team were notified including Dr. Heidi Mendiola, Dr. Kadie Rocha, Dr. Zhen Kumar, Dr. Bridgette Briceño and Dr. Candace Borges pronounced the patient  at 6

## 2017-02-13 NOTE — PLAN OF CARE
Received a call from telemetry, pt's HR going down to 30's. Found pt unresponsive with a lot of purulent vomitus. Called a code. Pt transferred to CCU at 2053.

## 2017-02-13 NOTE — PROGRESS NOTES
[facility] called and patient is a candidate for eye donation. [facility] requested that writer call back when patient has . Will continue to monitor.

## 2017-02-13 NOTE — TRANSITION NOTE
ADMITTED  POST-CODE; RESP RATE 30s,; NG DRAINING RED BLOOD; -130; TITRATING LEVO FOR BP;  INCONT LG AMT LIQUID BROWN STOOL; SACRAL/ANANT AREA EXCORIATED, BLEEDING ON SACRAL AREA;  LT ARM WEEPING; ---AT 9512  PT DNR PER FAMILY WISHES

## 2017-02-13 NOTE — OCCUPATIONAL THERAPY NOTE
Pt with decline in medical status, transferred to CCU after Code Blue and is currently intubated and on a vent. Family is considering withdrawing care. OT is now on hold. Please write orders to resume, if appropriate. Thank you.     Abel Wu MA, OTR

## 2017-02-13 NOTE — WOUND PROGRESS NOTE
Wound care services  Received nursing consult for sacral ulcer, the pt. was last assessed 2/10/17 and skin care orders were placed and the pt. was placed on a 1st step overlay air mattress.  Pt's is intubated now and the pt. remains on the 1st step overlay

## 2017-02-13 NOTE — PROGRESS NOTES
Unable to obtain pulse ox this afternoon (which nursing staff has been having trouble with d/t ongoing fingertip cyanosis). Attempted to obtain pulse ox on fingertips, forehead, and earlobe w/o success. Patient denies sob; appears comfortable in bed.  No ch

## 2017-02-13 NOTE — PHYSICAL THERAPY NOTE
Pt is transferred to CCU and is on vent at this time. Per OT family is planning f withdrawing care. Will place skilled PT management on hold. Will new new PT eval tx order if family wants to resume skilled management and when pt is stable and is approp.

## 2017-02-13 NOTE — PLAN OF CARE
Received report at bedside. Pt is aao x 2, shortness of breath noted, on 2L O2. Pt denies pain at this time, able to suction self. Instructed to call for assistance, safety precautions maintained, will monitor.

## 2017-02-13 NOTE — PROGRESS NOTES
Death Note    Called to see patient for unresponsiveness. On exam the patient did not respond to verbal or physical stimuli. There were no heart or breath sounds. Rhythm strip shows asystole.  Patient pronounced dead at 11:06AM.

## 2017-02-13 NOTE — TRANSITION NOTE
DR MAY NOTIFIED OF RESP RATE PERSISTENTLY 32-36; NG CONTINUES TO DRAIN RED BLOOD; FAMILY WISHES PT TO BE A DNR.   ORDERS RECEIVED FOR GANN AND DNR

## 2018-04-09 NOTE — ED PROVIDER NOTES
Patient Seen in: [facility] AND [facility]    History   Patient presents with:  Hypoglycemia (metabolic)    Stated Complaint: hypoglyemica    HPI    Patient complains of weakness. Was noted to have a blood sugar of 17.   Patient is not a dg lesions  NECK: supple, no meningeal signs  LUNGS: no resp distress, cta bilateral  CARDIO: RRR without murmur  GI: abdomen is soft and non tender, no masses, nl bowel sounds   EXTREMITIES: from, 5/5 strength in all 4 ext, no edema  NEURO: alert and oiented DRAW LAVENDER   RAINBOW DRAW LIGHT GREEN   RAINBOW DRAW DARK GREEN   RAINBOW DRAW LAVENDER TALL (BNP)   BLOOD CULTURE   BLOOD CULTURE       MDM       Cardiac Monitor: Pulse Readings from Last 1 Encounters:  01/30/17 : 101  , sinus,      Radiology findings: Consent (Scalp)/Introductory Paragraph: The rationale for Mohs was explained to the patient and consent was obtained. The risks, benefits and alternatives to therapy were discussed in detail. Specifically, the risks of changes in hair growth pattern secondary to repair, infection, scarring, bleeding, prolonged wound healing, incomplete removal, allergy to anesthesia, nerve injury and recurrence were addressed. Prior to the procedure, the treatment site was clearly identified and confirmed by the patient. All components of Universal Protocol/PAUSE Rule completed.

## (undated) NOTE — LETTER
[address], [place], [place]     AUTHORIZATION FOR SURGICAL OPERATION OR PROCEDURE    1.    I hereby authorize Dr. Corinne Shire, MD, my Physician(s) and whomever may be designated as the doctor's Assistant, to perform t donor transfusion, I will discuss this with my         Physician. 5.   I consent to the photographing of procedure(s) to be performed for the purposes of advancing medicine, science         and/or education, provided my identity is not revealed.  If the pr (Relationship to Patient)      _______________________________________________________________ ____________________________  (Witness signature)                                                                                                  (Date)

## (undated) NOTE — LETTER
[address] [facility], [facility] [address]     I agree to have a Peripherally Inserted Central Catheter (PICC) placed in my arm.      1. The PICC insertion procedure, care, maintenance, risks, benefits, and complications Statement of Physician: My signature below affirms that prior to the time of the PICC line insertion, I have explained to the patient and/or his/her legal representative, the risks and benefits involved in the proposed treatment and any reasonable alternat